# Patient Record
Sex: FEMALE | Race: WHITE | NOT HISPANIC OR LATINO | Employment: OTHER | URBAN - METROPOLITAN AREA
[De-identification: names, ages, dates, MRNs, and addresses within clinical notes are randomized per-mention and may not be internally consistent; named-entity substitution may affect disease eponyms.]

---

## 2023-05-30 ENCOUNTER — OFFICE VISIT (OUTPATIENT)
Dept: PRIMARY CARE | Facility: CLINIC | Age: 73
End: 2023-05-30
Payer: MEDICARE

## 2023-05-30 VITALS — SYSTOLIC BLOOD PRESSURE: 116 MMHG | WEIGHT: 153 LBS | DIASTOLIC BLOOD PRESSURE: 72 MMHG | BODY MASS INDEX: 22.59 KG/M2

## 2023-05-30 DIAGNOSIS — R73.01 IFG (IMPAIRED FASTING GLUCOSE): ICD-10-CM

## 2023-05-30 DIAGNOSIS — E78.5 HYPERLIPIDEMIA, UNSPECIFIED HYPERLIPIDEMIA TYPE: ICD-10-CM

## 2023-05-30 DIAGNOSIS — M81.0 OSTEOPOROSIS, UNSPECIFIED OSTEOPOROSIS TYPE, UNSPECIFIED PATHOLOGICAL FRACTURE PRESENCE: Primary | ICD-10-CM

## 2023-05-30 PROBLEM — M75.02 ADHESIVE CAPSULITIS OF LEFT SHOULDER: Status: ACTIVE | Noted: 2023-05-30

## 2023-05-30 PROBLEM — K63.5 COLON POLYP: Status: ACTIVE | Noted: 2023-05-30

## 2023-05-30 PROBLEM — L72.0 INCLUSION CYST: Status: ACTIVE | Noted: 2018-09-07

## 2023-05-30 PROBLEM — S82.009A CLOSED FRACTURE OF PATELLA: Status: ACTIVE | Noted: 2022-12-21

## 2023-05-30 PROBLEM — R92.2 DENSE BREAST: Status: ACTIVE | Noted: 2019-12-10

## 2023-05-30 PROBLEM — M85.80 OSTEOPENIA: Status: ACTIVE | Noted: 2023-05-30

## 2023-05-30 PROBLEM — E04.2 MULTIPLE THYROID NODULES: Status: ACTIVE | Noted: 2023-05-30

## 2023-05-30 PROBLEM — Z86.0100 HISTORY OF COLON POLYPS: Status: ACTIVE | Noted: 2021-10-27

## 2023-05-30 PROBLEM — E55.9 VITAMIN D INSUFFICIENCY: Status: ACTIVE | Noted: 2023-05-30

## 2023-05-30 PROBLEM — M67.90 TENDON NODULE: Status: ACTIVE | Noted: 2023-05-30

## 2023-05-30 PROBLEM — R79.89 LOW TSH LEVEL: Status: ACTIVE | Noted: 2023-05-30

## 2023-05-30 PROBLEM — M62.442: Status: ACTIVE | Noted: 2021-12-13

## 2023-05-30 PROBLEM — M79.676 TOE PAIN: Status: ACTIVE | Noted: 2023-05-30

## 2023-05-30 PROBLEM — F41.8 TEST ANXIETY: Status: ACTIVE | Noted: 2023-05-30

## 2023-05-30 PROBLEM — E11.9 TYPE 2 DIABETES MELLITUS TREATED WITHOUT INSULIN (MULTI): Status: ACTIVE | Noted: 2021-10-27

## 2023-05-30 PROBLEM — Z86.010 HISTORY OF COLON POLYPS: Status: ACTIVE | Noted: 2021-10-27

## 2023-05-30 PROBLEM — R92.8 ABNORMAL MAMMOGRAM: Status: ACTIVE | Noted: 2023-05-30

## 2023-05-30 PROBLEM — E04.9 NON-TOXIC GOITER: Status: ACTIVE | Noted: 2023-05-30

## 2023-05-30 PROBLEM — R92.30 DENSE BREAST: Status: ACTIVE | Noted: 2019-12-10

## 2023-05-30 LAB — POC HEMOGLOBIN A1C: 5.9 % (ref 4.2–6.5)

## 2023-05-30 PROCEDURE — 1160F RVW MEDS BY RX/DR IN RCRD: CPT | Performed by: FAMILY MEDICINE

## 2023-05-30 PROCEDURE — 99214 OFFICE O/P EST MOD 30 MIN: CPT | Performed by: FAMILY MEDICINE

## 2023-05-30 PROCEDURE — 1159F MED LIST DOCD IN RCRD: CPT | Performed by: FAMILY MEDICINE

## 2023-05-30 PROCEDURE — 3078F DIAST BP <80 MM HG: CPT | Performed by: FAMILY MEDICINE

## 2023-05-30 PROCEDURE — 3074F SYST BP LT 130 MM HG: CPT | Performed by: FAMILY MEDICINE

## 2023-05-30 PROCEDURE — 83036 HEMOGLOBIN GLYCOSYLATED A1C: CPT | Performed by: FAMILY MEDICINE

## 2023-05-30 PROCEDURE — 1036F TOBACCO NON-USER: CPT | Performed by: FAMILY MEDICINE

## 2023-05-30 RX ORDER — ASPIRIN 81 MG/1
81 TABLET ORAL DAILY
Qty: 90 TABLET | Refills: 1 | Status: SHIPPED | OUTPATIENT
Start: 2023-05-30 | End: 2023-11-29 | Stop reason: SDUPTHER

## 2023-05-30 RX ORDER — ATORVASTATIN CALCIUM 20 MG/1
20 TABLET, FILM COATED ORAL DAILY
Qty: 90 TABLET | Refills: 1 | Status: SHIPPED | OUTPATIENT
Start: 2023-05-30 | End: 2023-11-29 | Stop reason: SDUPTHER

## 2023-05-30 RX ORDER — ATORVASTATIN CALCIUM 20 MG/1
20 TABLET, FILM COATED ORAL DAILY
COMMUNITY
Start: 2019-07-01 | End: 2023-05-30 | Stop reason: SDUPTHER

## 2023-05-30 RX ORDER — ASPIRIN 81 MG/1
81 TABLET ORAL DAILY
COMMUNITY
Start: 2019-07-01 | End: 2023-05-30 | Stop reason: SDUPTHER

## 2023-05-30 RX ORDER — METFORMIN HYDROCHLORIDE 500 MG/1
500 TABLET, EXTENDED RELEASE ORAL
Qty: 90 TABLET | Refills: 1 | Status: SHIPPED | OUTPATIENT
Start: 2023-05-30 | End: 2023-11-29 | Stop reason: SDUPTHER

## 2023-05-30 RX ORDER — MULTIVITAMIN
1 TABLET ORAL
COMMUNITY

## 2023-05-30 RX ORDER — METFORMIN HYDROCHLORIDE 500 MG/1
500 TABLET, EXTENDED RELEASE ORAL
COMMUNITY
Start: 2019-04-03 | End: 2023-05-30 | Stop reason: SDUPTHER

## 2023-05-30 ASSESSMENT — ENCOUNTER SYMPTOMS
LOSS OF SENSATION IN FEET: 0
OCCASIONAL FEELINGS OF UNSTEADINESS: 0
DEPRESSION: 0

## 2023-05-30 NOTE — PATIENT INSTRUCTIONS
Remember to get Shingrix shot please.    Follow up fasting (no alcohol for 48 hours and just water for 14 hours) in three months for your next routine appointment.  In general, take any medications on schedule (except for types of Insulin).

## 2023-05-30 NOTE — PROGRESS NOTES
Subjective   Patient ID: [id number]     Amrita Pepe is a 73 y.o. female who presents for Med Management.    HPI  Taking meds as directed without tolerability or affordability issues; no complaints today.    Review of Systems    PSYCH-No complaints regarding libido, appetite, memory or concentration;  no drug use or alcohol usage over six per week;  tired regarding taking care of  with recent health issues  SLEEP-No complaints of insomnia, apnea or restless legs  ALL/IMMUN-No history of sneezing or itching  HEM-No unexplained bruising or bleeding    Objective     /72 (BP Location: Right arm, Patient Position: Sitting)   Wt 69.4 kg (153 lb)   BMI 22.59 kg/m²      Physical Exam  Neck-supple without lymphadenopathy or thyromegaly; no carotid bruits  Heart- regular rate and rhythm, normal s1 and s2 without murmur or gallop  Lungs-clear to auscultation  Abdomen-soft, positive bowel sounds, without masses, HSmegaly or pain   Assessment/Plan     Problem List Items Addressed This Visit          Musculoskeletal    Osteoporosis - Primary    Relevant Medications    calcium carbonate-vitamin D3 600 mg-10 mcg (400 unit) chewable tablet       Endocrine/Metabolic    IFG (impaired fasting glucose)    Relevant Medications    aspirin 81 mg EC tablet    metFORMIN XR (Glucophage-XR) 500 mg 24 hr tablet    Other Relevant Orders    POCT glycosylated hemoglobin (Hb A1C) manually resulted       Other    Hyperlipidemia    Relevant Medications    atorvastatin (Lipitor) 20 mg tablet     Remember to get Shingrix shot please.    Follow up fasting (no alcohol for 48 hours and just water for 14 hours) in three months for your next routine appointment.  In general, take any medications on schedule (except for types of Insulin).      Ye Bledsoe MD

## 2023-08-29 ENCOUNTER — OFFICE VISIT (OUTPATIENT)
Dept: PRIMARY CARE | Facility: CLINIC | Age: 73
End: 2023-08-29
Payer: MEDICARE

## 2023-08-29 VITALS
TEMPERATURE: 97.8 F | DIASTOLIC BLOOD PRESSURE: 68 MMHG | SYSTOLIC BLOOD PRESSURE: 108 MMHG | BODY MASS INDEX: 21.86 KG/M2 | WEIGHT: 148 LBS

## 2023-08-29 DIAGNOSIS — E78.5 HYPERLIPIDEMIA, UNSPECIFIED HYPERLIPIDEMIA TYPE: Primary | ICD-10-CM

## 2023-08-29 DIAGNOSIS — E11.9 TYPE 2 DIABETES MELLITUS TREATED WITHOUT INSULIN (MULTI): ICD-10-CM

## 2023-08-29 PROBLEM — M79.676 TOE PAIN: Status: RESOLVED | Noted: 2023-05-30 | Resolved: 2023-08-29

## 2023-08-29 PROBLEM — S82.009A CLOSED FRACTURE OF PATELLA: Status: RESOLVED | Noted: 2022-12-21 | Resolved: 2023-08-29

## 2023-08-29 LAB
POC FINGERSTICK BLOOD GLUCOSE: 118 MG/DL (ref 70–100)
POC HDL CHOLESTEROL: 81 MG/DL (ref 0–50)
POC LDL CHOLESTEROL: ABNORMAL
POC NON-HDL CHOLESTEROL: 62 MG/DL (ref 0–130)
POC TOTAL CHOLESTEROL/HDL RATIO: 1.8 (ref 0–4.5)
POC TOTAL CHOLESTEROL: 143 MG/DL (ref 0–199)
POC TRIGLYCERIDES: ABNORMAL

## 2023-08-29 PROCEDURE — 1036F TOBACCO NON-USER: CPT | Performed by: FAMILY MEDICINE

## 2023-08-29 PROCEDURE — 80061 LIPID PANEL: CPT | Performed by: FAMILY MEDICINE

## 2023-08-29 PROCEDURE — 99214 OFFICE O/P EST MOD 30 MIN: CPT | Performed by: FAMILY MEDICINE

## 2023-08-29 PROCEDURE — 1126F AMNT PAIN NOTED NONE PRSNT: CPT | Performed by: FAMILY MEDICINE

## 2023-08-29 PROCEDURE — 3078F DIAST BP <80 MM HG: CPT | Performed by: FAMILY MEDICINE

## 2023-08-29 PROCEDURE — 82962 GLUCOSE BLOOD TEST: CPT | Performed by: FAMILY MEDICINE

## 2023-08-29 PROCEDURE — 1159F MED LIST DOCD IN RCRD: CPT | Performed by: FAMILY MEDICINE

## 2023-08-29 PROCEDURE — 3074F SYST BP LT 130 MM HG: CPT | Performed by: FAMILY MEDICINE

## 2023-08-29 PROCEDURE — 1160F RVW MEDS BY RX/DR IN RCRD: CPT | Performed by: FAMILY MEDICINE

## 2023-08-29 NOTE — PATIENT INSTRUCTIONS
Please check with your insurance to verify whether you should get your shingles vaccination here or at the pharmacy, and whether it is covered.  The purpose of this shot is to prevent the permanent unremitting pain of Post Herpetic Neuralgia which becomes more common in elderly patients that get Shingles.  This shot can be very expensive.    Follow up in three months for your next routine appointment.

## 2023-08-29 NOTE — PROGRESS NOTES
Subjective   Patient ID: [id number]     Amrita Pepe is a 73 y.o. female who presents for Med Management.    HPI  Taking meds as directed without tolerability or affordability issues; no complaints today.    Review of Systems  General-denies weakness or myalgias; no unexplained fever or chills  OPTH-No dry eyes, itchy eyes, or blurry vision;  got a dilated eye exam in Spring 2023  ENT-No hearing loss, tinnitus or vertigo  NECK-no stiffness, swelling or pain    Objective     /68 (BP Location: Left arm, Patient Position: Sitting)   Temp 36.6 °C (97.8 °F) (Oral)   Wt 67.1 kg (148 lb)   BMI 21.86 kg/m²      Physical Exam  Eyes-pupils equal round, reactive to light and accommodation, fundi with normal cup/disc ratio, conjunctiva without redness or discharge  General- well defined, well nourished, well hydrated individual in NAD  Skin- normal color and turgor; without nail pitting  Head-normocephalic without masses or tenderness  Ears-normal pinnae, and canals, with normal landmarks and light reflex of tympanic membranes bilaterally  Nose-septum in the midline, normal mucosa bilaterally  Throat- without erythema or exudate, uvula in midlineNeck-supple without lymphadenopathy or thyromegaly; no carotid bruits  Heart- regular rate and rhythm, normal s1 and s2 without murmur or gallop  Lungs-clear to auscultation  Abdomen-soft, positive bowel sounds, without masses, HSmegaly or pain     Assessment/Plan     Problem List Items Addressed This Visit       Hyperlipidemia - Primary    Relevant Orders    POCT fingerstick glucose manually resulted (Completed)    POCT Lipid Panel manually resulted (Completed)    Type 2 diabetes mellitus treated without insulin (CMS/[facility])     Please check with your insurance to verify whether you should get your shingles vaccination here or at the pharmacy, and whether it is covered.  The purpose of this shot is to prevent the permanent unremitting pain of Post Herpetic Neuralgia which  becomes more common in elderly patients that get Shingles.  This shot can be very expensive.    Follow up in three months for your next routine appointment.    Ye Bledsoe MD

## 2023-11-03 ENCOUNTER — APPOINTMENT (OUTPATIENT)
Dept: PRIMARY CARE | Facility: CLINIC | Age: 73
End: 2023-11-03
Payer: COMMERCIAL

## 2023-11-07 ENCOUNTER — TELEPHONE (OUTPATIENT)
Dept: PRIMARY CARE | Facility: CLINIC | Age: 73
End: 2023-11-07
Payer: MEDICARE

## 2023-11-07 NOTE — TELEPHONE ENCOUNTER
Pt is calling in regards to the Shingles vaccine. Pt states that she got the Flu vaccine 3 weeks ago and the Covid vaccine a week ago. Pt wanted to know if she needed to wait before getting the shingles vaccine or if she could get the vaccine now? Pt is asking if someone can give her a call to let her know?

## 2023-11-28 PROBLEM — Z23 ENCOUNTER FOR IMMUNIZATION: Status: ACTIVE | Noted: 2023-11-28

## 2023-11-28 NOTE — PROGRESS NOTES
"Subjective   Reason for Visit: Amrita Pepe is an 73 y.o. female here for a Medicare Wellness visit.     Past Medical, Surgical, and Family History reviewed and updated in chart.  In the past 2 weeks this patient has not felt down, depressed, hopeless, lost interest or pleasure in doing things or thought of harming herself or others. The patient has not fallen in the last six months and has no loose rugs,  uneven floors or poor lighting at home.Advance Care Planning discussion was held.  The patient has no spiritual/Spiritism/cultural values or needs that we need to know. The patient does not feel threatened or abused physically, emotionally or sexually.  The patient feels safe in the home.  In the past month, there was not a day when the patient of anyone in the patient's family went hungry because there was not enough food.  The patient denies that they or the person with them has problems with hearing, speaking, reading, moving around or learning.  The patient states they are comfortable filling out medical forms.      Reviewed all medications by prescribing practitioner or clinical pharmacist (such as prescriptions, OTCs, herbal therapies and supplements) and documented in the medical record.    HPI  Taking meds as directed without tolerability or affordability issues; no complaints today.    Patient Care Team:  Ye Bledsoe MD as PCP - General  Ye Bledsoe MD as PCP - [facility] Attributed Provider     Review of Systems  Cardiovascular- No chest pain or pressure, nausea, diaphoresis, paresthesias, dizziness, or syncope with or without exertion  Gastrointestinal-No blood in stool, tarry stools, pain, vomiting, heartburn, constipation or diarrhea  Pulmonary-No wheezing, coughing or shortness of breath  Urology-No frequency, urgency, blood in urine, or incontinence    Objective   Vitals:  /85 (BP Location: Right arm, Patient Position: Sitting)   Temp 36.8 °C (98.2 °F) (Oral)   Ht 1.74 m (5' 8.5\")   " Wt 68 kg (150 lb)   BMI 22.48 kg/m²       Physical Exam  Neck-supple without lymphadenopathy or thyromegaly; no carotid bruits  Heart- regular rate and rhythm, normal s1 and s2 without murmur or gallop;  no edema  Lungs-clear to auscultation  Abdomen-soft, positive bowel sounds, without masses, HSmegaly or pain     Assessment/Plan   Problem List Items Addressed This Visit       Hyperlipidemia - Primary    Overview     Last Assessment & Plan: Formatting of this note might be different from the original. Assessment: on atorvastatin         Relevant Medications    atorvastatin (Lipitor) 20 mg tablet    Low TSH level    Relevant Orders    TSH    Non-toxic goiter    Relevant Orders    TSH    Multiple thyroid nodules    Relevant Orders    TSH    Osteoporosis    Overview     Hemanth Anne MD [facility]         Relevant Medications    calcium carbonate-vitamin D3 600 mg-10 mcg (400 unit) chewable tablet    Type 2 diabetes mellitus treated without insulin (CMS/[facility])    Overview     Last Assessment & Plan: Formatting of this note might be different from the original. Assessment: on metformin.  Managed by PCP         Relevant Medications    metFORMIN  mg 24 hr tablet    Other Relevant Orders    Hemoglobin A1c    Comprehensive metabolic panel    Vitamin B12    Vitamin D insufficiency    Relevant Orders    Vitamin D 25-Hydroxy,Total (for eval of Vitamin D levels)    Encounter for immunization     Please check with your insurance to verify whether you should get your shingles vaccination here or at the pharmacy, and whether it is covered.  The purpose of this shot is to prevent the permanent unremitting pain of Post Herpetic Neuralgia which becomes more common in elderly patients that get Shingles.  This shot can be very expensive.    Follow up fasting (no alcohol for 48 hours and just water for 14 hours) in three months for your next routine appointment.  In general, take any medications on schedule (except for types of Insulin).        Patient was identified as a fall risk. Risk prevention instructions provided.

## 2023-11-28 NOTE — PATIENT INSTRUCTIONS
Please check with your insurance to verify whether you should get your shingles vaccination here or at the pharmacy, and whether it is covered.  The purpose of this shot is to prevent the permanent unremitting pain of Post Herpetic Neuralgia which becomes more common in elderly patients that get Shingles.  This shot can be very expensive.    Follow up fasting (no alcohol for 48 hours and just water for 14 hours) in three months for your next routine appointment.  In general, take any medications on schedule (except for types of Insulin).              Ways to Help Prevent Falls at Home    Quick Tips   ? Ask for help if you need it. Most people want to help!   ? Get up slowly after sitting or laying down   ? Wear a medical alert device or keep cell phone in your pocket   ? Use night lights, especially areas near a bathroom   ? Keep the items you use often within reach on a small stool or end table   ? Use an assistive device such as walker or cane, as directed by provider/physical therapy   ? Use a non-slip mat and grab bars in your bathroom. Look for home health sections for best options     Other Areas to Focus On   ? Exercise and nutrition: Regular exercise or taking a falls prevention class are great ways improve strength and balance. Don’t forget to stay hydrated and bring a snack!   ? Medicine side effects: Some medicines can make you sleepy or dizzy, which could cause a fall. Ask your healthcare provider about the side effects your medicines could cause. Be sure to let them know if you take any vitamins or supplements as well.   ? Tripping hazards: Remove items you could trip on, such as loose mats, rugs, cords, and clutter. Wear closed toe shoes with rubber soles.   ? Health and wellness: Get regular checkups with your healthcare provider, plus routine vision and hearing screenings. Talk with your healthcare provider about:   o Your medicines and the possible side effects - bring them in a bag if that is  easier!   o Problems with balance or feeling dizzy   o Ways to promote bone health, such as Vitamin D and calcium supplements   o Questions or concerns about falling     *Ask your healthcare team if you have questions     ©[facility], 2022

## 2023-11-29 ENCOUNTER — OFFICE VISIT (OUTPATIENT)
Dept: PRIMARY CARE | Facility: CLINIC | Age: 73
End: 2023-11-29
Payer: MEDICARE

## 2023-11-29 ENCOUNTER — LAB (OUTPATIENT)
Dept: LAB | Facility: LAB | Age: 73
End: 2023-11-29
Payer: MEDICARE

## 2023-11-29 VITALS
BODY MASS INDEX: 22.22 KG/M2 | TEMPERATURE: 98.2 F | DIASTOLIC BLOOD PRESSURE: 85 MMHG | HEIGHT: 69 IN | SYSTOLIC BLOOD PRESSURE: 122 MMHG | WEIGHT: 150 LBS

## 2023-11-29 DIAGNOSIS — M81.0 OSTEOPOROSIS, UNSPECIFIED OSTEOPOROSIS TYPE, UNSPECIFIED PATHOLOGICAL FRACTURE PRESENCE: ICD-10-CM

## 2023-11-29 DIAGNOSIS — E11.9 TYPE 2 DIABETES MELLITUS TREATED WITHOUT INSULIN (MULTI): ICD-10-CM

## 2023-11-29 DIAGNOSIS — E78.5 HYPERLIPIDEMIA, UNSPECIFIED HYPERLIPIDEMIA TYPE: Primary | ICD-10-CM

## 2023-11-29 DIAGNOSIS — Z23 ENCOUNTER FOR IMMUNIZATION: ICD-10-CM

## 2023-11-29 DIAGNOSIS — E04.2 MULTIPLE THYROID NODULES: ICD-10-CM

## 2023-11-29 DIAGNOSIS — E04.9 NON-TOXIC GOITER: ICD-10-CM

## 2023-11-29 DIAGNOSIS — R79.89 LOW TSH LEVEL: ICD-10-CM

## 2023-11-29 DIAGNOSIS — E55.9 VITAMIN D INSUFFICIENCY: ICD-10-CM

## 2023-11-29 LAB
25(OH)D3 SERPL-MCNC: 38 NG/ML (ref 30–100)
ALBUMIN SERPL BCP-MCNC: 4.7 G/DL (ref 3.4–5)
ALP SERPL-CCNC: 94 U/L (ref 33–136)
ALT SERPL W P-5'-P-CCNC: 18 U/L (ref 7–45)
ANION GAP SERPL CALC-SCNC: 15 MMOL/L (ref 10–20)
AST SERPL W P-5'-P-CCNC: 17 U/L (ref 9–39)
BILIRUB SERPL-MCNC: 0.6 MG/DL (ref 0–1.2)
BUN SERPL-MCNC: 22 MG/DL (ref 6–23)
CALCIUM SERPL-MCNC: 10 MG/DL (ref 8.6–10.3)
CHLORIDE SERPL-SCNC: 101 MMOL/L (ref 98–107)
CO2 SERPL-SCNC: 28 MMOL/L (ref 21–32)
CREAT SERPL-MCNC: 0.77 MG/DL (ref 0.5–1.05)
EST. AVERAGE GLUCOSE BLD GHB EST-MCNC: 131 MG/DL
GFR SERPL CREATININE-BSD FRML MDRD: 82 ML/MIN/1.73M*2
GLUCOSE SERPL-MCNC: 115 MG/DL (ref 74–99)
HBA1C MFR BLD: 6.2 %
POTASSIUM SERPL-SCNC: 5.1 MMOL/L (ref 3.5–5.3)
PROT SERPL-MCNC: 7.4 G/DL (ref 6.4–8.2)
SODIUM SERPL-SCNC: 139 MMOL/L (ref 136–145)
TSH SERPL-ACNC: 0.21 MIU/L (ref 0.44–3.98)
VIT B12 SERPL-MCNC: 588 PG/ML (ref 211–911)

## 2023-11-29 PROCEDURE — 1036F TOBACCO NON-USER: CPT | Performed by: FAMILY MEDICINE

## 2023-11-29 PROCEDURE — G0444 DEPRESSION SCREEN ANNUAL: HCPCS | Performed by: FAMILY MEDICINE

## 2023-11-29 PROCEDURE — G0439 PPPS, SUBSEQ VISIT: HCPCS | Performed by: FAMILY MEDICINE

## 2023-11-29 PROCEDURE — 1126F AMNT PAIN NOTED NONE PRSNT: CPT | Performed by: FAMILY MEDICINE

## 2023-11-29 PROCEDURE — 80053 COMPREHEN METABOLIC PANEL: CPT

## 2023-11-29 PROCEDURE — 82607 VITAMIN B-12: CPT

## 2023-11-29 PROCEDURE — 1123F ACP DISCUSS/DSCN MKR DOCD: CPT | Performed by: FAMILY MEDICINE

## 2023-11-29 PROCEDURE — 36415 COLL VENOUS BLD VENIPUNCTURE: CPT

## 2023-11-29 PROCEDURE — 3074F SYST BP LT 130 MM HG: CPT | Performed by: FAMILY MEDICINE

## 2023-11-29 PROCEDURE — 3079F DIAST BP 80-89 MM HG: CPT | Performed by: FAMILY MEDICINE

## 2023-11-29 PROCEDURE — 82306 VITAMIN D 25 HYDROXY: CPT

## 2023-11-29 PROCEDURE — 1160F RVW MEDS BY RX/DR IN RCRD: CPT | Performed by: FAMILY MEDICINE

## 2023-11-29 PROCEDURE — 84443 ASSAY THYROID STIM HORMONE: CPT

## 2023-11-29 PROCEDURE — G0442 ANNUAL ALCOHOL SCREEN 15 MIN: HCPCS | Performed by: FAMILY MEDICINE

## 2023-11-29 PROCEDURE — 1170F FXNL STATUS ASSESSED: CPT | Performed by: FAMILY MEDICINE

## 2023-11-29 PROCEDURE — 83036 HEMOGLOBIN GLYCOSYLATED A1C: CPT

## 2023-11-29 PROCEDURE — 1159F MED LIST DOCD IN RCRD: CPT | Performed by: FAMILY MEDICINE

## 2023-11-29 PROCEDURE — 99214 OFFICE O/P EST MOD 30 MIN: CPT | Performed by: FAMILY MEDICINE

## 2023-11-29 RX ORDER — METFORMIN HYDROCHLORIDE 500 MG/1
500 TABLET, EXTENDED RELEASE ORAL
Qty: 90 TABLET | Refills: 1 | Status: SHIPPED | OUTPATIENT
Start: 2023-11-29 | End: 2023-11-29 | Stop reason: SDUPTHER

## 2023-11-29 RX ORDER — ATORVASTATIN CALCIUM 20 MG/1
20 TABLET, FILM COATED ORAL DAILY
Qty: 90 TABLET | Refills: 1 | Status: SHIPPED | OUTPATIENT
Start: 2023-11-29 | End: 2024-02-28 | Stop reason: SDUPTHER

## 2023-11-29 RX ORDER — METFORMIN HYDROCHLORIDE 500 MG/1
500 TABLET, EXTENDED RELEASE ORAL
Qty: 90 TABLET | Refills: 1 | Status: SHIPPED | OUTPATIENT
Start: 2023-11-29 | End: 2024-05-30 | Stop reason: SDUPTHER

## 2023-11-29 RX ORDER — ASPIRIN 81 MG/1
81 TABLET ORAL DAILY
Qty: 90 TABLET | Refills: 1 | Status: SHIPPED | OUTPATIENT
Start: 2023-11-29 | End: 2023-11-29

## 2023-11-29 ASSESSMENT — ENCOUNTER SYMPTOMS
DEPRESSION: 0
OCCASIONAL FEELINGS OF UNSTEADINESS: 0
LOSS OF SENSATION IN FEET: 0

## 2023-11-29 ASSESSMENT — ACTIVITIES OF DAILY LIVING (ADL)
DOING_HOUSEWORK: INDEPENDENT
GROCERY_SHOPPING: INDEPENDENT
BATHING: INDEPENDENT
MANAGING_FINANCES: INDEPENDENT
TAKING_MEDICATION: INDEPENDENT
DRESSING: INDEPENDENT

## 2023-11-29 ASSESSMENT — PATIENT HEALTH QUESTIONNAIRE - PHQ9
SUM OF ALL RESPONSES TO PHQ9 QUESTIONS 1 AND 2: 0
1. LITTLE INTEREST OR PLEASURE IN DOING THINGS: NOT AT ALL
2. FEELING DOWN, DEPRESSED OR HOPELESS: NOT AT ALL

## 2024-01-17 ENCOUNTER — OFFICE VISIT (OUTPATIENT)
Dept: PRIMARY CARE | Facility: CLINIC | Age: 74
End: 2024-01-17
Payer: MEDICARE

## 2024-01-17 VITALS
WEIGHT: 151 LBS | BODY MASS INDEX: 22.63 KG/M2 | OXYGEN SATURATION: 99 % | TEMPERATURE: 98.4 F | HEART RATE: 92 BPM | SYSTOLIC BLOOD PRESSURE: 139 MMHG | DIASTOLIC BLOOD PRESSURE: 88 MMHG

## 2024-01-17 DIAGNOSIS — E11.9 TYPE 2 DIABETES MELLITUS TREATED WITHOUT INSULIN (MULTI): ICD-10-CM

## 2024-01-17 DIAGNOSIS — J18.9 PNEUMONIA OF RIGHT UPPER LOBE DUE TO INFECTIOUS ORGANISM: Primary | ICD-10-CM

## 2024-01-17 PROCEDURE — 99495 TRANSJ CARE MGMT MOD F2F 14D: CPT | Performed by: FAMILY MEDICINE

## 2024-01-17 PROCEDURE — 1126F AMNT PAIN NOTED NONE PRSNT: CPT | Performed by: FAMILY MEDICINE

## 2024-01-17 PROCEDURE — 1036F TOBACCO NON-USER: CPT | Performed by: FAMILY MEDICINE

## 2024-01-17 PROCEDURE — 3075F SYST BP GE 130 - 139MM HG: CPT | Performed by: FAMILY MEDICINE

## 2024-01-17 PROCEDURE — 3079F DIAST BP 80-89 MM HG: CPT | Performed by: FAMILY MEDICINE

## 2024-01-17 PROCEDURE — 1159F MED LIST DOCD IN RCRD: CPT | Performed by: FAMILY MEDICINE

## 2024-01-17 PROCEDURE — 1160F RVW MEDS BY RX/DR IN RCRD: CPT | Performed by: FAMILY MEDICINE

## 2024-01-17 PROCEDURE — 1157F ADVNC CARE PLAN IN RCRD: CPT | Performed by: FAMILY MEDICINE

## 2024-01-17 RX ORDER — PROMETHAZINE HYDROCHLORIDE AND DEXTROMETHORPHAN HYDROBROMIDE 6.25; 15 MG/5ML; MG/5ML
5 SYRUP ORAL EVERY 6 HOURS PRN
COMMUNITY
Start: 2024-01-08 | End: 2024-01-17 | Stop reason: ALTCHOICE

## 2024-01-17 RX ORDER — LEVOFLOXACIN 500 MG/1
TABLET, FILM COATED ORAL
COMMUNITY
Start: 2024-01-08 | End: 2024-01-17 | Stop reason: ALTCHOICE

## 2024-01-17 NOTE — PROGRESS NOTES
Subjective   Patient ID: [id number]     Amrita Pepe is a 73 y.o. female who presents for ER Follow-up (pneumonia).    SHRUTHI Russell got a cough on 02JAN2024 and then worsened to the point that she visited [facility] in [place] and was diagnosed with pneumonia;  COVID and FLU negative;  she was treated with Levaquin and has improved.    Review of Systems  General-no unexplained fever since 08JAN2024  ENT- has mild sore throat  NECK-has sterno cledio mastoid soreness bilaterally  Cardiovascular- No chest pain  Gastrointestinal-No vomiting, heartburn, constipation or diarrhea  Pulmonary-No shortness of breath; she has minimal coughing    Objective     /88 (BP Location: Right arm, Patient Position: Sitting)   Pulse 92   Temp 36.9 °C (98.4 °F) (Oral)   Wt 68.5 kg (151 lb)   SpO2 99%   BMI 22.63 kg/m²      Physical Exam  General- well defined, well nourished, well hydrated individual in NAD  Skin- normal color and turgor  Ears-normal pinnae, and canals, with normal landmarks and light reflex of tympanic membranes bilaterally  Nose-septum in the midline, normal mucosa bilaterally  Throat- without erythema or exudate, uvula in midlineNeck-supple without lymphadenopathy or thyromegaly; no carotid bruits  Heart- regular rate and rhythm, normal s1 and s2 without murmur or gallop  Lungs-clear to auscultation    Assessment/Plan     Problem List Items Addressed This Visit       Type 2 diabetes mellitus treated without insulin (CMS/[facility])     Other Visit Diagnoses       Pneumonia of right upper lobe due to infectious organism    -  Primary    Relevant Orders    XR chest 2 views        Expect your aerobic capacity to take about a month to fully return.  Call for any questions.    Get the RSV immunization when you can.    Ye Bledsoe MD

## 2024-01-17 NOTE — PATIENT INSTRUCTIONS
Expect your aerobic capacity to take about a month to fully return.  Call for any questions.    Get the RSV immunization when you can.

## 2024-02-01 ENCOUNTER — HOSPITAL ENCOUNTER (OUTPATIENT)
Dept: RADIOLOGY | Facility: CLINIC | Age: 74
Discharge: HOME | End: 2024-02-01
Payer: MEDICARE

## 2024-02-01 DIAGNOSIS — J18.9 PNEUMONIA OF RIGHT UPPER LOBE DUE TO INFECTIOUS ORGANISM: ICD-10-CM

## 2024-02-01 PROCEDURE — 71046 X-RAY EXAM CHEST 2 VIEWS: CPT

## 2024-02-01 PROCEDURE — 71046 X-RAY EXAM CHEST 2 VIEWS: CPT | Performed by: RADIOLOGY

## 2024-02-26 NOTE — PATIENT INSTRUCTIONS
Please check with your insurance to verify whether you should get your shingles vaccination here or at the pharmacy, and whether it is covered.  The purpose of this shot is to prevent the permanent unremitting pain of Post Herpetic Neuralgia which becomes more common in elderly patients that get Shingles.  This shot can be very expensive.    Do the corner exercises as demonstrated for your left shoulder and call if your left and right shoulder range of motion is not equal within two weeks .    Follow up in three months for your next routine appointment.

## 2024-02-26 NOTE — PROGRESS NOTES
Subjective   Patient ID: [id number]     Amrita Pepe is a 73 y.o. female who presents for Med Management.    HPI  Taking meds as directed without tolerability or affordability issues; no complaints today.    Review of Systems  ENDO- No change in hair, voice, skin, weight or temperature tolerance   MSK-No locking, giving way/swelling of joints;  DC is stable but left little finger is numb after her surgery;  left shoulder is painful from time to time  NEURO- No daily headaches, hx of concussion, falls in the last year or seizure  DERM-No rashes, blanching or  change in any moles     Objective     /82 (BP Location: Left arm, Patient Position: Sitting)   Temp 36.7 °C (98.1 °F) (Oral)   Wt 68.9 kg (152 lb)   BMI 22.78 kg/m²      Physical Exam  Neck-supple without lymphadenopathy or thyromegaly; no carotid bruits  Throat- without erythema or exudate, uvula in midlineNeck-supple without lymphadenopathy or thyromegaly; no carotid bruits  Heart- regular rate and rhythm, normal s1 and s2 without murmur or gallop  Lungs-clear to auscultation  Abdomen-soft, positive bowel sounds, without masses, HSmegaly or pain   Left shoulder Exam- Full range of motion except abduction limited to 90 degrees;  Rotator cuff muscles examined individually and motor strength was 5/5 for the Suprispinatus, Infraspinatus, Teres Minor and Subscapularis Muscles;  Impingement testing was negative  Foot Exam-normal propioceptive, vibration and monofilament;  normal pulses, temperature and reflexes; normal hair distribution, skin integrity and color    Assessment/Plan     Problem List Items Addressed This Visit       Adhesive capsulitis of left shoulder    Dupuytren's contracture    Hyperlipidemia    Relevant Medications    atorvastatin (Lipitor) 20 mg tablet    Other Relevant Orders    POCT Lipid Panel manually resulted    Non-toxic goiter    Multiple thyroid nodules    Relevant Orders    US thyroid    Osteoporosis    Relevant Medications     calcium carbonate-vitamin D3 600 mg-10 mcg (400 unit) chewable tablet    RESOLVED: Metatarsalgia    Type 2 diabetes mellitus treated without insulin (CMS/[facility]) - Primary    Relevant Orders    POCT fingerstick glucose manually resulted    POCT Albumin random urine manually resulted     Please check with your insurance to verify whether you should get your shingles vaccination here or at the pharmacy, and whether it is covered.  The purpose of this shot is to prevent the permanent unremitting pain of Post Herpetic Neuralgia which becomes more common in elderly patients that get Shingles.  This shot can be very expensive.    Do the corner exercises as demonstrated for your left shoulder and call if your left and right shoulder range of motion is not equal within two weeks .    Follow up in three months for your next routine appointment.    Ye Bledsoe MD

## 2024-02-28 ENCOUNTER — OFFICE VISIT (OUTPATIENT)
Dept: PRIMARY CARE | Facility: CLINIC | Age: 74
End: 2024-02-28
Payer: MEDICARE

## 2024-02-28 VITALS
WEIGHT: 152 LBS | DIASTOLIC BLOOD PRESSURE: 82 MMHG | BODY MASS INDEX: 22.78 KG/M2 | TEMPERATURE: 98.1 F | SYSTOLIC BLOOD PRESSURE: 124 MMHG

## 2024-02-28 DIAGNOSIS — E78.5 HYPERLIPIDEMIA, UNSPECIFIED HYPERLIPIDEMIA TYPE: ICD-10-CM

## 2024-02-28 DIAGNOSIS — M72.0 DUPUYTREN'S CONTRACTURE: ICD-10-CM

## 2024-02-28 DIAGNOSIS — E04.2 MULTIPLE THYROID NODULES: ICD-10-CM

## 2024-02-28 DIAGNOSIS — M75.02 ADHESIVE CAPSULITIS OF LEFT SHOULDER: ICD-10-CM

## 2024-02-28 DIAGNOSIS — E11.9 TYPE 2 DIABETES MELLITUS TREATED WITHOUT INSULIN (MULTI): Primary | ICD-10-CM

## 2024-02-28 DIAGNOSIS — M77.40 METATARSALGIA, UNSPECIFIED LATERALITY: ICD-10-CM

## 2024-02-28 DIAGNOSIS — M81.0 OSTEOPOROSIS, UNSPECIFIED OSTEOPOROSIS TYPE, UNSPECIFIED PATHOLOGICAL FRACTURE PRESENCE: ICD-10-CM

## 2024-02-28 DIAGNOSIS — E04.9 NON-TOXIC GOITER: ICD-10-CM

## 2024-02-28 LAB
POC ALBUMIN /CREATININE RATIO MANUALLY ENTERED: <30 UG/MG CREAT
POC FINGERSTICK BLOOD GLUCOSE: 108 MG/DL (ref 70–100)
POC HDL CHOLESTEROL: 79 MG/DL (ref 0–50)
POC LDL CHOLESTEROL: 41 MG/DL (ref 0–100)
POC NON-HDL CHOLESTEROL: 52 MG/DL (ref 0–130)
POC TOTAL CHOLESTEROL/HDL RATIO: 1.7 (ref 0–4.5)
POC TOTAL CHOLESTEROL: 131 MG/DL (ref 0–199)
POC TRIGLYCERIDES: 54 MG/DL (ref 0–150)
POC URINE ALBUMIN: 30 MG/L
POC URINE CREATININE: 200 MG/DL

## 2024-02-28 PROCEDURE — 3074F SYST BP LT 130 MM HG: CPT | Performed by: FAMILY MEDICINE

## 2024-02-28 PROCEDURE — 82044 UR ALBUMIN SEMIQUANTITATIVE: CPT | Performed by: FAMILY MEDICINE

## 2024-02-28 PROCEDURE — 80061 LIPID PANEL: CPT | Performed by: FAMILY MEDICINE

## 2024-02-28 PROCEDURE — 1157F ADVNC CARE PLAN IN RCRD: CPT | Performed by: FAMILY MEDICINE

## 2024-02-28 PROCEDURE — 1036F TOBACCO NON-USER: CPT | Performed by: FAMILY MEDICINE

## 2024-02-28 PROCEDURE — 99214 OFFICE O/P EST MOD 30 MIN: CPT | Performed by: FAMILY MEDICINE

## 2024-02-28 PROCEDURE — 1126F AMNT PAIN NOTED NONE PRSNT: CPT | Performed by: FAMILY MEDICINE

## 2024-02-28 PROCEDURE — 2028F FOOT EXAM PERFORMED: CPT | Performed by: FAMILY MEDICINE

## 2024-02-28 PROCEDURE — 82962 GLUCOSE BLOOD TEST: CPT | Performed by: FAMILY MEDICINE

## 2024-02-28 PROCEDURE — 1160F RVW MEDS BY RX/DR IN RCRD: CPT | Performed by: FAMILY MEDICINE

## 2024-02-28 PROCEDURE — 3079F DIAST BP 80-89 MM HG: CPT | Performed by: FAMILY MEDICINE

## 2024-02-28 PROCEDURE — 1159F MED LIST DOCD IN RCRD: CPT | Performed by: FAMILY MEDICINE

## 2024-02-28 RX ORDER — ATORVASTATIN CALCIUM 20 MG/1
20 TABLET, FILM COATED ORAL DAILY
Qty: 90 TABLET | Refills: 1 | Status: SHIPPED | OUTPATIENT
Start: 2024-02-28 | End: 2024-05-30 | Stop reason: SDUPTHER

## 2024-03-07 ENCOUNTER — HOSPITAL ENCOUNTER (OUTPATIENT)
Dept: RADIOLOGY | Facility: CLINIC | Age: 74
Discharge: HOME | End: 2024-03-07
Payer: MEDICARE

## 2024-03-07 DIAGNOSIS — E04.2 MULTIPLE THYROID NODULES: ICD-10-CM

## 2024-03-07 PROCEDURE — 76536 US EXAM OF HEAD AND NECK: CPT

## 2024-03-07 PROCEDURE — 76536 US EXAM OF HEAD AND NECK: CPT | Performed by: RADIOLOGY

## 2024-04-02 DIAGNOSIS — E78.5 HYPERLIPIDEMIA, UNSPECIFIED HYPERLIPIDEMIA TYPE: ICD-10-CM

## 2024-04-02 RX ORDER — ATORVASTATIN CALCIUM 20 MG/1
20 TABLET, FILM COATED ORAL DAILY
Qty: 90 TABLET | Refills: 1 | OUTPATIENT
Start: 2024-04-02

## 2024-05-26 DIAGNOSIS — E11.9 TYPE 2 DIABETES MELLITUS TREATED WITHOUT INSULIN (MULTI): ICD-10-CM

## 2024-05-28 RX ORDER — METFORMIN HYDROCHLORIDE 500 MG/1
500 TABLET, EXTENDED RELEASE ORAL
Qty: 90 TABLET | Refills: 1 | OUTPATIENT
Start: 2024-05-28

## 2024-05-30 ENCOUNTER — OFFICE VISIT (OUTPATIENT)
Dept: PRIMARY CARE | Facility: CLINIC | Age: 74
End: 2024-05-30
Payer: MEDICARE

## 2024-05-30 ENCOUNTER — APPOINTMENT (OUTPATIENT)
Dept: PRIMARY CARE | Facility: CLINIC | Age: 74
End: 2024-05-30
Payer: MEDICARE

## 2024-05-30 VITALS
SYSTOLIC BLOOD PRESSURE: 114 MMHG | DIASTOLIC BLOOD PRESSURE: 60 MMHG | HEIGHT: 69 IN | BODY MASS INDEX: 22.81 KG/M2 | HEART RATE: 93 BPM | WEIGHT: 154 LBS | OXYGEN SATURATION: 98 %

## 2024-05-30 DIAGNOSIS — E11.9 TYPE 2 DIABETES MELLITUS WITHOUT COMPLICATION, WITHOUT LONG-TERM CURRENT USE OF INSULIN (MULTI): ICD-10-CM

## 2024-05-30 DIAGNOSIS — M81.8 OTHER OSTEOPOROSIS, UNSPECIFIED PATHOLOGICAL FRACTURE PRESENCE: ICD-10-CM

## 2024-05-30 DIAGNOSIS — E78.5 HYPERLIPIDEMIA, UNSPECIFIED HYPERLIPIDEMIA TYPE: ICD-10-CM

## 2024-05-30 DIAGNOSIS — E04.2 MULTIPLE THYROID NODULES: ICD-10-CM

## 2024-05-30 DIAGNOSIS — E11.9 TYPE 2 DIABETES MELLITUS TREATED WITHOUT INSULIN (MULTI): Primary | ICD-10-CM

## 2024-05-30 DIAGNOSIS — M72.0 DUPUYTREN'S CONTRACTURE: ICD-10-CM

## 2024-05-30 LAB — POC HEMOGLOBIN A1C: 6.1 % (ref 4.2–6.5)

## 2024-05-30 PROCEDURE — 1157F ADVNC CARE PLAN IN RCRD: CPT | Performed by: NURSE PRACTITIONER

## 2024-05-30 PROCEDURE — 1159F MED LIST DOCD IN RCRD: CPT | Performed by: NURSE PRACTITIONER

## 2024-05-30 PROCEDURE — 1160F RVW MEDS BY RX/DR IN RCRD: CPT | Performed by: NURSE PRACTITIONER

## 2024-05-30 PROCEDURE — 83036 HEMOGLOBIN GLYCOSYLATED A1C: CPT | Performed by: NURSE PRACTITIONER

## 2024-05-30 PROCEDURE — 2028F FOOT EXAM PERFORMED: CPT | Performed by: NURSE PRACTITIONER

## 2024-05-30 PROCEDURE — 99214 OFFICE O/P EST MOD 30 MIN: CPT | Performed by: NURSE PRACTITIONER

## 2024-05-30 PROCEDURE — 3074F SYST BP LT 130 MM HG: CPT | Performed by: NURSE PRACTITIONER

## 2024-05-30 PROCEDURE — 3078F DIAST BP <80 MM HG: CPT | Performed by: NURSE PRACTITIONER

## 2024-05-30 PROCEDURE — 1036F TOBACCO NON-USER: CPT | Performed by: NURSE PRACTITIONER

## 2024-05-30 RX ORDER — METFORMIN HYDROCHLORIDE 500 MG/1
500 TABLET, EXTENDED RELEASE ORAL
Qty: 90 TABLET | Refills: 0 | Status: SHIPPED | OUTPATIENT
Start: 2024-05-30

## 2024-05-30 RX ORDER — ATORVASTATIN CALCIUM 20 MG/1
20 TABLET, FILM COATED ORAL DAILY
Qty: 90 TABLET | Refills: 0 | Status: SHIPPED | OUTPATIENT
Start: 2024-05-30

## 2024-05-30 ASSESSMENT — PATIENT HEALTH QUESTIONNAIRE - PHQ9
2. FEELING DOWN, DEPRESSED OR HOPELESS: NOT AT ALL
SUM OF ALL RESPONSES TO PHQ9 QUESTIONS 1 & 2: 0
1. LITTLE INTEREST OR PLEASURE IN DOING THINGS: NOT AT ALL

## 2024-05-30 NOTE — PROGRESS NOTES
Subjective   Patient ID: Amrita Pepe is a 74 y.o. female who presents for Med Refill (Dr Ye Bledsoe patient/).    HPI     Diet: mix of healthy and unhealthy  Exercise: walking, weights   Water: 16 ounces per day   Nicotine: denies   Alcohol:     Hyperlipidemia:   Med: Lipitor  Denies myalgias or arthralgias.    DM2:   Med:   Metformin   Does not checks blood sugars.     Osteoporosis:   Dexa    Previously on Alendronate  Med: Calcium and Vit d    Multinodular goiter:   U/s thyroid 3/7/24 -stable     Dupuytren's Contracture:   Will pursue getting right hand fixed in the fall     Past Medical History:   Diagnosis Date    Disorder of thyroid, unspecified     Thyroid trouble    Other conditions influencing health status 2021    History of cough    Personal history of other diseases of the musculoskeletal system and connective tissue     History of arthritis    Personal history of other diseases of the respiratory system 2021    History of sore throat    Personal history of other infectious and parasitic diseases 2021    History of viral infection    Personal history of other mental and behavioral disorders 2018    History of adjustment disorder     Past Surgical History:   Procedure Laterality Date    FOOT SURGERY  2015    Foot Surgery     Social History     Socioeconomic History    Marital status:      Spouse name: Not on file    Number of children: Not on file    Years of education: Not on file    Highest education level: Not on file   Occupational History    Not on file   Tobacco Use    Smoking status: Former     Current packs/day: 0.00     Average packs/day: 1 pack/day for 10.0 years (10.0 ttl pk-yrs)     Types: Cigarettes     Start date:      Quit date:      Years since quittin.4    Smokeless tobacco: Never   Substance and Sexual Activity    Alcohol use: Yes     Alcohol/week: 5.0 standard drinks of alcohol     Types: 5 Glasses of wine per week    Drug use:  "Not on file    Sexual activity: Not on file   Other Topics Concern    Not on file   Social History Narrative    Exercise: none [id number]     Social Determinants of Health     Financial Resource Strain: Not on file   Food Insecurity: Not on file   Transportation Needs: Not on file   Physical Activity: Not on file   Stress: Not on file   Social Connections: Not on file   Intimate Partner Violence: Not on file   Housing Stability: Not on file     Family History   Problem Relation Name Age of Onset    Pancreatic cancer Mother      Heart failure Father      COPD Father      COPD Sister Aziza     Diabetes Sister Aziza          at 73    Atrial fibrillation Sister Aziza          Review of Systems    Objective   Visit Vitals  /60   Pulse 93   Ht 1.74 m (5' 8.5\")   Wt 69.9 kg (154 lb)   SpO2 98%   BMI 23.08 kg/m²   Smoking Status Former   BSA 1.84 m²         Physical Exam    Alert and oriented x 3  Neck supple without carotid bruit or lymph adenopathy   Heart regular rate and rhythm without murmur  Lungs clear to auscultation.  Legs without edema.  Gait is non-antalgic  Speech clear.  Hearing adequate.  Psych: Normal affect. Good judgment and insight.       Assessment/Plan     1. Type 2 diabetes mellitus without complication, without long-term current use of insulin (Multi)  - POCT glycosylated hemoglobin (Hb A1C) manually resulted  - metFORMIN  mg 24 hr tablet; Take 1 tablet (500 mg) by mouth once daily in the evening. Take with meals.  Dispense: 90 tablet; Refill: 0    2. Hyperlipidemia, unspecified hyperlipidemia type  - atorvastatin (Lipitor) 20 mg tablet; Take 1 tablet (20 mg) by mouth once daily.  Dispense: 90 tablet; Refill: 0    3. Multiple thyroid nodules      4. Other osteoporosis, unspecified pathological fracture presence   Continue current medications.    5. Dupuytren's contracture  Follow-up with specialist per their discretion/direction.     Follow up: 3 months with Dr Ye Bledsoe    Medications " refills will be completed as discussed.     Any labs or testing that is ordered will be reviewed and the results will be in your chart .   You can review these via  MONTAJ.     Prescriptions will not be filled unless you are compliant with your follow-up appointments or have a follow-up appointment scheduled as per the instruction of your provider. Refills for medications should be requested at the time of your office visit.     Please allow one week for refill requests to be completed.     Contact office with any questions or concerns.   Preferred communication is via  MONTAJ      Call [employer]: [phone] to assist with scheduling.      Delia [facility]  [address], [address]  [place] [address]  Phone: [phone]    **Charting was completed using voice recognition technology and may include unintended errors**

## 2024-05-31 ENCOUNTER — APPOINTMENT (OUTPATIENT)
Dept: PRIMARY CARE | Facility: CLINIC | Age: 74
End: 2024-05-31
Payer: MEDICARE

## 2024-05-31 DIAGNOSIS — E11.9 TYPE 2 DIABETES MELLITUS TREATED WITHOUT INSULIN (MULTI): ICD-10-CM

## 2024-05-31 RX ORDER — METFORMIN HYDROCHLORIDE 500 MG/1
500 TABLET, EXTENDED RELEASE ORAL
Qty: 90 TABLET | Refills: 0 | OUTPATIENT
Start: 2024-05-31

## 2024-08-27 NOTE — PATIENT INSTRUCTIONS
Considering your health conditions, I recommend that you get the RSV vaccine at your local pharmacy.    See your eye doctor for a dilated eye exam at least every year to screen for the gradual loss of vision that can occur with Glaucoma and the potentially catastrophic effects of Diabetes.    Follow up in three months for your next routine appointment.

## 2024-08-30 ENCOUNTER — APPOINTMENT (OUTPATIENT)
Dept: PRIMARY CARE | Facility: CLINIC | Age: 74
End: 2024-08-30
Payer: MEDICARE

## 2024-08-30 VITALS
TEMPERATURE: 97.5 F | BODY MASS INDEX: 22.89 KG/M2 | DIASTOLIC BLOOD PRESSURE: 74 MMHG | SYSTOLIC BLOOD PRESSURE: 124 MMHG | WEIGHT: 152.78 LBS

## 2024-08-30 DIAGNOSIS — E11.9 TYPE 2 DIABETES MELLITUS TREATED WITHOUT INSULIN (MULTI): Primary | ICD-10-CM

## 2024-08-30 DIAGNOSIS — E04.2 MULTIPLE THYROID NODULES: ICD-10-CM

## 2024-08-30 DIAGNOSIS — E04.9 NON-TOXIC GOITER: ICD-10-CM

## 2024-08-30 DIAGNOSIS — E78.5 HYPERLIPIDEMIA, UNSPECIFIED HYPERLIPIDEMIA TYPE: ICD-10-CM

## 2024-08-30 LAB
POC FINGERSTICK BLOOD GLUCOSE: 97 MG/DL (ref 70–100)
POC HDL CHOLESTEROL: 79 MG/DL (ref 0–50)
POC LDL CHOLESTEROL: 48 MG/DL (ref 0–100)
POC NON-HDL CHOLESTEROL: 64 MG/DL (ref 0–130)
POC TOTAL CHOLESTEROL/HDL RATIO: 1.8 (ref 0–4.5)
POC TOTAL CHOLESTEROL: 143 MG/DL (ref 0–199)
POC TRIGLYCERIDES: 77 MG/DL (ref 0–150)

## 2024-08-30 PROCEDURE — 3074F SYST BP LT 130 MM HG: CPT | Performed by: FAMILY MEDICINE

## 2024-08-30 PROCEDURE — 82962 GLUCOSE BLOOD TEST: CPT | Performed by: FAMILY MEDICINE

## 2024-08-30 PROCEDURE — 80061 LIPID PANEL: CPT | Performed by: FAMILY MEDICINE

## 2024-08-30 PROCEDURE — 1157F ADVNC CARE PLAN IN RCRD: CPT | Performed by: FAMILY MEDICINE

## 2024-08-30 PROCEDURE — 1123F ACP DISCUSS/DSCN MKR DOCD: CPT | Performed by: FAMILY MEDICINE

## 2024-08-30 PROCEDURE — 2028F FOOT EXAM PERFORMED: CPT | Performed by: FAMILY MEDICINE

## 2024-08-30 PROCEDURE — 1160F RVW MEDS BY RX/DR IN RCRD: CPT | Performed by: FAMILY MEDICINE

## 2024-08-30 PROCEDURE — 99214 OFFICE O/P EST MOD 30 MIN: CPT | Performed by: FAMILY MEDICINE

## 2024-08-30 PROCEDURE — 3078F DIAST BP <80 MM HG: CPT | Performed by: FAMILY MEDICINE

## 2024-08-30 PROCEDURE — 1159F MED LIST DOCD IN RCRD: CPT | Performed by: FAMILY MEDICINE

## 2024-08-30 RX ORDER — ATORVASTATIN CALCIUM 20 MG/1
20 TABLET, FILM COATED ORAL DAILY
Qty: 90 TABLET | Refills: 0 | Status: SHIPPED | OUTPATIENT
Start: 2024-08-30

## 2024-08-30 RX ORDER — METFORMIN HYDROCHLORIDE 500 MG/1
500 TABLET, EXTENDED RELEASE ORAL
Qty: 90 TABLET | Refills: 0 | Status: SHIPPED | OUTPATIENT
Start: 2024-08-30

## 2024-09-13 ENCOUNTER — OFFICE VISIT (OUTPATIENT)
Dept: PRIMARY CARE | Facility: CLINIC | Age: 74
End: 2024-09-13
Payer: MEDICARE

## 2024-09-13 VITALS
SYSTOLIC BLOOD PRESSURE: 141 MMHG | WEIGHT: 154.32 LBS | TEMPERATURE: 97.8 F | BODY MASS INDEX: 23.12 KG/M2 | DIASTOLIC BLOOD PRESSURE: 78 MMHG

## 2024-09-13 DIAGNOSIS — R42 VERTIGO: Primary | ICD-10-CM

## 2024-09-13 PROCEDURE — 1159F MED LIST DOCD IN RCRD: CPT | Performed by: FAMILY MEDICINE

## 2024-09-13 PROCEDURE — 1123F ACP DISCUSS/DSCN MKR DOCD: CPT | Performed by: FAMILY MEDICINE

## 2024-09-13 PROCEDURE — 1157F ADVNC CARE PLAN IN RCRD: CPT | Performed by: FAMILY MEDICINE

## 2024-09-13 PROCEDURE — 3078F DIAST BP <80 MM HG: CPT | Performed by: FAMILY MEDICINE

## 2024-09-13 PROCEDURE — 99213 OFFICE O/P EST LOW 20 MIN: CPT | Performed by: FAMILY MEDICINE

## 2024-09-13 PROCEDURE — 1036F TOBACCO NON-USER: CPT | Performed by: FAMILY MEDICINE

## 2024-09-13 PROCEDURE — 2028F FOOT EXAM PERFORMED: CPT | Performed by: FAMILY MEDICINE

## 2024-09-13 PROCEDURE — 1160F RVW MEDS BY RX/DR IN RCRD: CPT | Performed by: FAMILY MEDICINE

## 2024-09-13 PROCEDURE — 3077F SYST BP >= 140 MM HG: CPT | Performed by: FAMILY MEDICINE

## 2024-09-13 NOTE — PATIENT INSTRUCTIONS
Your Benign Positional Vertigo can be alleviated by reproducing the movement that triggered the vertigo and keeping your eyes fixed on a point on the horizon or inside, choose a wall switch.  Then you need to repeat the maneuver five times, each time waiting until the issue resolves.  Call if your symptoms persist over one week.

## 2024-09-13 NOTE — PROGRESS NOTES
Subjective   Patient ID: [id number]     Amrita Pepe is a 74 y.o. female who presents for Dizziness.    HPI  Got up this morning and felt unsteady on her feet leaning to the left a for a second or so;  developed some nausea later in the morning    Review of Systems  ID-no fever  ENT- no hearing loss, tinnitus or vertigo; had a sore throat last week  ENDO- No change in hair, voice, skin, weight or temperature tolerance   Cardiovascular- No chest pain or pressure, nausea, diaphoresis, paresthesias, dizziness, or syncope with or without exertion  Gastrointestinal-No blood in stool, tarry stools, pain, vomiting, heartburn, constipation or diarrhea  Pulmonary-No wheezing, coughing or shortness of breath  Urology-No frequency, urgency, blood in urine, or incontinence  NEURO- No daily headaches, hx of concussion, fall or seizure in the last year     Objective     /78 (BP Location: Left arm, Patient Position: Sitting)   Temp 36.6 °C (97.8 °F) (Oral)   Wt 70 kg (154 lb 5.2 oz)   BMI 23.12 kg/m²      Physical Exam  Neck-supple without lymphadenopathy or thyromegaly; no carotid bruits  Heart- regular rate and rhythm, normal s1 and s2 without murmur or gallop  Lungs-clear to auscultation  General- well defined, well nourished, well hydrated individual in NAD  Ears-normal pinnae, and canals, with normal landmarks and light reflex of tympanic membranes bilaterally  Abdomen-soft, positive bowel sounds, without masses, HSmegaly or pain   Neuro- alert and & oriented times three;  CN's II through XII grossly intact; DTR's 2+ and symmetrical;  Negative Romberg;  muscle strength is 5/5 bilateral upper and lower extremities  Ocular nystagmus easily extinguished with sit to supine    Assessment/Plan     Problem List Items Addressed This Visit       Vertigo - Primary     Your Benign Positional Vertigo can be alleviated by reproducing the movement that triggered the vertigo and keeping your eyes fixed on a point on the horizon  or inside, choose a wall switch.  Then you need to repeat the maneuver five times, each time waiting until the issue resolves.  Call if your symptoms persist over one week.        Ye Bledsoe MD

## 2024-11-14 ENCOUNTER — APPOINTMENT (OUTPATIENT)
Dept: PRIMARY CARE | Facility: CLINIC | Age: 74
End: 2024-11-14
Payer: MEDICARE

## 2024-11-29 DIAGNOSIS — E11.9 TYPE 2 DIABETES MELLITUS TREATED WITHOUT INSULIN (MULTI): ICD-10-CM

## 2024-12-01 RX ORDER — METFORMIN HYDROCHLORIDE 500 MG/1
TABLET, EXTENDED RELEASE ORAL
Qty: 90 TABLET | Refills: 0 | OUTPATIENT
Start: 2024-12-01

## 2024-12-10 NOTE — PROGRESS NOTES
"Subjective   Reason for Visit: Amrita Pepe is an 74 y.o. female here for a Medicare Wellness visit.   Past Medical, Surgical, and Family History reviewed and updated in chart.    In the past 2 weeks this patient has not felt down, depressed, hopeless, lost interest or pleasure in doing things or thought of harming herself or others and this was discussed over five minutes. The patient has not fallen in the last six months and has no loose rugs,  uneven floors or poor lighting at home.  Advance Care Planning discussion was held over 5 minutes.  The patient has no spiritual/Confucianism/cultural values or needs that we need to know. The patient does not feel threatened or abused physically, emotionally or sexually.  The patient feels safe in the home.  In the past month, there was not a day when the patient of anyone in the patient's family went hungry because there was not enough food.  The patient denies that they or the person with them has problems with hearing, speaking, reading, moving around or learning.  The patient states they are comfortable filling out medical forms. Alcohol usage was dicussed over five minutes.    Reviewed all medications by prescribing practitioner or clinical pharmacist (such as prescriptions, OTCs, herbal therapies and supplements) and documented in the medical record.  HPI  Taking meds as directed without tolerability or affordability issues.  Still with left shoulder pain and tightness    Patient Care Team:  Ye Bledsoe MD as PCP - General  Ye Bledsoe MD as PCP [facility]     Review of Systems  ENDO-no voice, skin, hair or change in temperature tolerance;  no swelling in neck  HEM-no unexplained bruising or bleeding  PSYCH-mood is good; waking up rested without gasping or restless legs    Objective   Vitals:  /82 (BP Location: Right arm, Patient Position: Sitting)   Temp 36.6 °C (97.8 °F) (Oral)   Ht 1.753 m (5' 9\")   Wt 70 kg (154 lb 5.2 oz)   " BMI 22.79 kg/m²       Physical Exam  Neck-supple without lymphadenopathy or thyromegaly; no carotid bruits  Heart- regular rate and rhythm, normal s1 and s2 without murmur or gallop;  no edema  Lungs-clear to auscultation  Abdomen-soft, positive bowel sounds, without masses, HSmegaly or pain   MS-bilateral dupuytren;  left shoulder abduction limited to 120 degrees ;  rotator cuff muscles 5/5 bilaterally and no impingement signs  Assessment/Plan   Problem List Items Addressed This Visit       Adhesive capsulitis of left shoulder    Relevant Orders    Referral to Physical Therapy    Dupuytren's contracture    Overview     Last Assessment & Plan: Formatting of this note might be different from the original. Assessment: scheduled for surgery         Relevant Orders    Referral to Orthopaedic Surgery    Hyperlipidemia    Overview     Last Assessment & Plan: Formatting of this note might be different from the original. Assessment: on atorvastatin         Relevant Medications    atorvastatin (Lipitor) 20 mg tablet    Multiple thyroid nodules    Type 2 diabetes mellitus treated without insulin (Multi) - Primary    Overview     Last Assessment & Plan: Formatting of this note might be different from the original. Assessment: on metformin.  Managed by PCP         Relevant Medications    metFORMIN  mg 24 hr tablet    Other Relevant Orders    POCT glycosylated hemoglobin (Hb A1C) manually resulted       Do the corner exercises as discussed for the next two weeks and physical therapy.  Please call with an update in one month.    Follow up fasting (no alcohol for 48 hours and just water for 14 hours) in three months for your next routine appointment.  In general, take any medications on schedule (except for types of Insulin).

## 2024-12-11 ENCOUNTER — APPOINTMENT (OUTPATIENT)
Dept: PRIMARY CARE | Facility: CLINIC | Age: 74
End: 2024-12-11
Payer: MEDICARE

## 2024-12-11 VITALS
BODY MASS INDEX: 22.86 KG/M2 | WEIGHT: 154.32 LBS | DIASTOLIC BLOOD PRESSURE: 82 MMHG | HEIGHT: 69 IN | SYSTOLIC BLOOD PRESSURE: 119 MMHG | TEMPERATURE: 97.8 F

## 2024-12-11 DIAGNOSIS — E78.5 HYPERLIPIDEMIA, UNSPECIFIED HYPERLIPIDEMIA TYPE: ICD-10-CM

## 2024-12-11 DIAGNOSIS — E04.2 MULTIPLE THYROID NODULES: ICD-10-CM

## 2024-12-11 DIAGNOSIS — M72.0 DUPUYTREN'S CONTRACTURE: ICD-10-CM

## 2024-12-11 DIAGNOSIS — M75.02 ADHESIVE CAPSULITIS OF LEFT SHOULDER: ICD-10-CM

## 2024-12-11 DIAGNOSIS — E11.9 TYPE 2 DIABETES MELLITUS TREATED WITHOUT INSULIN (MULTI): Primary | ICD-10-CM

## 2024-12-11 LAB — POC HEMOGLOBIN A1C: 6.2 % (ref 4.2–6.5)

## 2024-12-11 PROCEDURE — 1170F FXNL STATUS ASSESSED: CPT | Performed by: FAMILY MEDICINE

## 2024-12-11 PROCEDURE — 1157F ADVNC CARE PLAN IN RCRD: CPT | Performed by: FAMILY MEDICINE

## 2024-12-11 PROCEDURE — 3079F DIAST BP 80-89 MM HG: CPT | Performed by: FAMILY MEDICINE

## 2024-12-11 PROCEDURE — G0442 ANNUAL ALCOHOL SCREEN 15 MIN: HCPCS | Performed by: FAMILY MEDICINE

## 2024-12-11 PROCEDURE — 1159F MED LIST DOCD IN RCRD: CPT | Performed by: FAMILY MEDICINE

## 2024-12-11 PROCEDURE — 99214 OFFICE O/P EST MOD 30 MIN: CPT | Performed by: FAMILY MEDICINE

## 2024-12-11 PROCEDURE — 3008F BODY MASS INDEX DOCD: CPT | Performed by: FAMILY MEDICINE

## 2024-12-11 PROCEDURE — 1123F ACP DISCUSS/DSCN MKR DOCD: CPT | Performed by: FAMILY MEDICINE

## 2024-12-11 PROCEDURE — 2028F FOOT EXAM PERFORMED: CPT | Performed by: FAMILY MEDICINE

## 2024-12-11 PROCEDURE — 1160F RVW MEDS BY RX/DR IN RCRD: CPT | Performed by: FAMILY MEDICINE

## 2024-12-11 PROCEDURE — 1036F TOBACCO NON-USER: CPT | Performed by: FAMILY MEDICINE

## 2024-12-11 PROCEDURE — G0439 PPPS, SUBSEQ VISIT: HCPCS | Performed by: FAMILY MEDICINE

## 2024-12-11 PROCEDURE — G0444 DEPRESSION SCREEN ANNUAL: HCPCS | Performed by: FAMILY MEDICINE

## 2024-12-11 PROCEDURE — 83036 HEMOGLOBIN GLYCOSYLATED A1C: CPT | Performed by: FAMILY MEDICINE

## 2024-12-11 PROCEDURE — 3074F SYST BP LT 130 MM HG: CPT | Performed by: FAMILY MEDICINE

## 2024-12-11 RX ORDER — ATORVASTATIN CALCIUM 20 MG/1
20 TABLET, FILM COATED ORAL DAILY
Qty: 90 TABLET | Refills: 0 | Status: SHIPPED | OUTPATIENT
Start: 2024-12-11

## 2024-12-11 RX ORDER — METFORMIN HYDROCHLORIDE 500 MG/1
500 TABLET, EXTENDED RELEASE ORAL
Qty: 90 TABLET | Refills: 0 | Status: SHIPPED | OUTPATIENT
Start: 2024-12-11

## 2024-12-11 ASSESSMENT — ACTIVITIES OF DAILY LIVING (ADL)
BATHING: INDEPENDENT
DRESSING: INDEPENDENT
GROCERY_SHOPPING: INDEPENDENT
MANAGING_FINANCES: INDEPENDENT
TAKING_MEDICATION: INDEPENDENT
DOING_HOUSEWORK: INDEPENDENT

## 2024-12-11 ASSESSMENT — ENCOUNTER SYMPTOMS
DEPRESSION: 0
LOSS OF SENSATION IN FEET: 0
OCCASIONAL FEELINGS OF UNSTEADINESS: 0

## 2024-12-11 ASSESSMENT — PATIENT HEALTH QUESTIONNAIRE - PHQ9
2. FEELING DOWN, DEPRESSED OR HOPELESS: NOT AT ALL
SUM OF ALL RESPONSES TO PHQ9 QUESTIONS 1 AND 2: 0
1. LITTLE INTEREST OR PLEASURE IN DOING THINGS: NOT AT ALL

## 2025-03-06 NOTE — PATIENT INSTRUCTIONS
See your eye doctor for a dilated eye exam at least every year to screen for the gradual loss of vision that can occur with Glaucoma and the potentially catastrophic effects of Diabetes.    Follow up in three months for your next routine appointment.    Ye Bledsoe MD

## 2025-03-06 NOTE — PROGRESS NOTES
Subjective   Patient ID: [id number]     Amrita Pepe is a 74 y.o. female who presents for Med Management.    HPI  Taking meds as directed without tolerability or affordability issues; no complaints today.    Review of Systems  GEN-denies weakness or myalgias; no unexplained fever or chills  OPTH-No dry eyes, itchy eyes, or blurry vision   ENT-No hearing loss, tinnitus or vertigo  NECK-no stiffness, swelling or pain    Objective     /86 (BP Location: Right arm, Patient Position: Sitting)   Temp 36.4 °C (97.6 °F) (Oral)   Wt 70 kg (154 lb 5.2 oz)   BMI 22.79 kg/m²      Physical Exam  General- well defined, well nourished, well hydrated individual in NAD  Skin- normal color and turgor; without nail pitting  Head-normocephalic without masses or tenderness  Eyes-pupils equal round, reactive to light and accommodation, fundi with normal cup/disc ratio, conjunctiva without redness or discharge  Ears-normal pinnae, and canals, with normal landmarks and light reflex of tympanic membranes bilaterally  Nose-septum in the midline, normal mucosa bilaterally  Throat- without erythema or exudate, uvula in midline  Neck-supple without lymphadenopathy or thyromegaly; no carotid bruits  Heart- regular rate and rhythm, normal s1 and s2 without murmur or gallop; peripheral pulses 2+ and symmetrical  Lungs-clear to auscultation  Abdomen-soft, positive bowel sounds, without masses, HSmegaly or pain     Assessment/Plan     Problem List Items Addressed This Visit       Dense breast    Hyperlipidemia    Relevant Medications    atorvastatin (Lipitor) 20 mg tablet    Other Relevant Orders    Lipid Panel    Osteoporosis    Relevant Orders    XR DEXA bone density    Type 2 diabetes mellitus treated without insulin (Multi)    Relevant Medications    metFORMIN  mg 24 hr tablet    Other Relevant Orders    Glucose    RESOLVED: Vertigo     Other Visit Diagnoses       Health care maintenance    -  Primary    Relevant Orders    BI  mammo bilateral screening tomosynthesis    Encounter for screening mammogram for malignant neoplasm of breast        Relevant Orders    BI mammo bilateral screening tomosynthesis          See your eye doctor for a dilated eye exam at least every year to screen for the gradual loss of vision that can occur with Glaucoma and the potentially catastrophic effects of Diabetes.    Follow up in three months for your next routine appointment.    Ye Bledsoe MD

## 2025-03-07 ENCOUNTER — APPOINTMENT (OUTPATIENT)
Dept: PRIMARY CARE | Facility: CLINIC | Age: 75
End: 2025-03-07
Payer: COMMERCIAL

## 2025-03-07 VITALS
BODY MASS INDEX: 22.79 KG/M2 | DIASTOLIC BLOOD PRESSURE: 86 MMHG | SYSTOLIC BLOOD PRESSURE: 123 MMHG | WEIGHT: 154.32 LBS | TEMPERATURE: 97.6 F

## 2025-03-07 DIAGNOSIS — R92.30 DENSE BREAST: ICD-10-CM

## 2025-03-07 DIAGNOSIS — E78.5 HYPERLIPIDEMIA, UNSPECIFIED HYPERLIPIDEMIA TYPE: ICD-10-CM

## 2025-03-07 DIAGNOSIS — Z12.31 ENCOUNTER FOR SCREENING MAMMOGRAM FOR MALIGNANT NEOPLASM OF BREAST: ICD-10-CM

## 2025-03-07 DIAGNOSIS — R92.2 DENSE BREAST: ICD-10-CM

## 2025-03-07 DIAGNOSIS — R42 VERTIGO: ICD-10-CM

## 2025-03-07 DIAGNOSIS — M81.0 OSTEOPOROSIS, UNSPECIFIED OSTEOPOROSIS TYPE, UNSPECIFIED PATHOLOGICAL FRACTURE PRESENCE: ICD-10-CM

## 2025-03-07 DIAGNOSIS — Z00.00 HEALTH CARE MAINTENANCE: Primary | ICD-10-CM

## 2025-03-07 DIAGNOSIS — E11.9 TYPE 2 DIABETES MELLITUS TREATED WITHOUT INSULIN (MULTI): ICD-10-CM

## 2025-03-07 PROCEDURE — 1157F ADVNC CARE PLAN IN RCRD: CPT | Performed by: FAMILY MEDICINE

## 2025-03-07 PROCEDURE — 1159F MED LIST DOCD IN RCRD: CPT | Performed by: FAMILY MEDICINE

## 2025-03-07 PROCEDURE — 3079F DIAST BP 80-89 MM HG: CPT | Performed by: FAMILY MEDICINE

## 2025-03-07 PROCEDURE — 3074F SYST BP LT 130 MM HG: CPT | Performed by: FAMILY MEDICINE

## 2025-03-07 PROCEDURE — 99214 OFFICE O/P EST MOD 30 MIN: CPT | Performed by: FAMILY MEDICINE

## 2025-03-07 PROCEDURE — 1123F ACP DISCUSS/DSCN MKR DOCD: CPT | Performed by: FAMILY MEDICINE

## 2025-03-07 RX ORDER — METFORMIN HYDROCHLORIDE 500 MG/1
500 TABLET, EXTENDED RELEASE ORAL
Qty: 90 TABLET | Refills: 0 | Status: SHIPPED | OUTPATIENT
Start: 2025-03-07

## 2025-03-07 RX ORDER — ATORVASTATIN CALCIUM 20 MG/1
20 TABLET, FILM COATED ORAL DAILY
Qty: 90 TABLET | Refills: 0 | Status: SHIPPED | OUTPATIENT
Start: 2025-03-07

## 2025-03-07 RX ORDER — PSYLLIUM HUSK 0.4 G
1 CAPSULE ORAL DAILY
COMMUNITY

## 2025-03-08 DIAGNOSIS — E11.9 TYPE 2 DIABETES MELLITUS TREATED WITHOUT INSULIN (MULTI): ICD-10-CM

## 2025-03-08 LAB
CHOLEST SERPL-MCNC: 168 MG/DL
CHOLEST/HDLC SERPL: 1.7 (CALC)
GLUCOSE SERPL-MCNC: 115 MG/DL (ref 65–99)
HDLC SERPL-MCNC: 97 MG/DL
LDLC SERPL CALC-MCNC: 56 MG/DL (CALC)
NONHDLC SERPL-MCNC: 71 MG/DL (CALC)
TRIGL SERPL-MCNC: 74 MG/DL

## 2025-03-10 RX ORDER — METFORMIN HYDROCHLORIDE 500 MG/1
TABLET, EXTENDED RELEASE ORAL
Qty: 90 TABLET | Refills: 0 | OUTPATIENT
Start: 2025-03-10

## 2025-06-03 DIAGNOSIS — E11.9 TYPE 2 DIABETES MELLITUS TREATED WITHOUT INSULIN (MULTI): ICD-10-CM

## 2025-06-03 RX ORDER — METFORMIN HYDROCHLORIDE 500 MG/1
TABLET, EXTENDED RELEASE ORAL
Qty: 90 TABLET | Refills: 0 | OUTPATIENT
Start: 2025-06-03

## 2025-06-06 NOTE — PROGRESS NOTES
Subjective   Patient ID: [id number]     Amrita Pepe is a 75 y.o. female who presents for Med Management.    HPI  Taking meds as directed without tolerability or affordability issues; no complaints today.    Review of Systems  CARDIO- No chest pain or pressure, nausea, diaphoresis, paresthesias, dizziness, or syncope with or without exertion  GI-No blood in stool, tarry stools, pain, vomiting, heartburn, constipation or diarrhea  PULM-No wheezing, coughing or shortness of breath  UROL-No frequency, urgency, blood in urine, or incontinence; nocturia not present    Objective     /83 (BP Location: Right arm, Patient Position: Sitting, BP Cuff Size: Adult)   Temp 36.6 °C (97.9 °F) (Oral)   Wt 70.5 kg (155 lb 6.8 oz)   BMI 22.95 kg/m²      Physical Exam  Neck-supple without lymphadenopathy or thyromegaly; no carotid bruits  Heart- regular rate and rhythm, normal s1 and s2 without murmur or gallop; no edema  Lungs-clear to auscultation  Abdomen-soft, positive bowel sounds, without masses, HSmegaly or pain     Assessment/Plan     Problem List Items Addressed This Visit       RESOLVED: Abnormal mammogram    Dense breast    Colon polyp    Hyperlipidemia    Relevant Medications    atorvastatin (Lipitor) 20 mg tablet    Low TSH level    Multiple thyroid nodules    Type 2 diabetes mellitus treated without insulin (Multi) - Primary    Relevant Medications    metFORMIN  mg 24 hr tablet    Other Relevant Orders    Comprehensive Metabolic Panel    Hemoglobin A1C    Thyroid Stimulating Hormone    Albumin-Creatinine Ratio, Urine Random    Vitamin D insufficiency    Relevant Orders    Vitamin D 25-Hydroxy,Total (for eval of Vitamin D levels)     Other Visit Diagnoses         Postmenopausal        Relevant Orders    XR DEXA bone density          See your eye doctor for a dilated eye exam at least every year to screen for the gradual loss of vision that can occur with Glaucoma and the potentially catastrophic effects of  Diabetes.    You are eligible for the COVID booster.  Without a recent (within 90 days) challenge (booster or infection) your immune system will be three days behind in protecting you from the infection.  You will infect approximately five people by that time.    Follow up fasting (no alcohol for 48 hours and just water for 14 hours) in three months for your next routine appointment.  In general, take any medications on schedule (except for types of Insulin).      Ye Bledsoe MD

## 2025-06-09 ENCOUNTER — APPOINTMENT (OUTPATIENT)
Dept: PRIMARY CARE | Facility: CLINIC | Age: 75
End: 2025-06-09
Payer: MEDICARE

## 2025-06-09 VITALS
TEMPERATURE: 97.9 F | DIASTOLIC BLOOD PRESSURE: 83 MMHG | BODY MASS INDEX: 22.95 KG/M2 | WEIGHT: 155.42 LBS | SYSTOLIC BLOOD PRESSURE: 135 MMHG

## 2025-06-09 DIAGNOSIS — E04.2 MULTIPLE THYROID NODULES: ICD-10-CM

## 2025-06-09 DIAGNOSIS — R79.89 LOW TSH LEVEL: ICD-10-CM

## 2025-06-09 DIAGNOSIS — K63.5 POLYP OF COLON, UNSPECIFIED PART OF COLON, UNSPECIFIED TYPE: ICD-10-CM

## 2025-06-09 DIAGNOSIS — E11.9 TYPE 2 DIABETES MELLITUS TREATED WITHOUT INSULIN (MULTI): Primary | ICD-10-CM

## 2025-06-09 DIAGNOSIS — E55.9 VITAMIN D INSUFFICIENCY: ICD-10-CM

## 2025-06-09 DIAGNOSIS — Z78.0 POSTMENOPAUSAL: ICD-10-CM

## 2025-06-09 DIAGNOSIS — E78.5 HYPERLIPIDEMIA, UNSPECIFIED HYPERLIPIDEMIA TYPE: ICD-10-CM

## 2025-06-09 DIAGNOSIS — R92.8 ABNORMAL MAMMOGRAM: ICD-10-CM

## 2025-06-09 DIAGNOSIS — R92.30 DENSE BREAST: ICD-10-CM

## 2025-06-09 PROCEDURE — 1159F MED LIST DOCD IN RCRD: CPT | Performed by: FAMILY MEDICINE

## 2025-06-09 PROCEDURE — 1160F RVW MEDS BY RX/DR IN RCRD: CPT | Performed by: FAMILY MEDICINE

## 2025-06-09 PROCEDURE — 99214 OFFICE O/P EST MOD 30 MIN: CPT | Performed by: FAMILY MEDICINE

## 2025-06-09 PROCEDURE — 3075F SYST BP GE 130 - 139MM HG: CPT | Performed by: FAMILY MEDICINE

## 2025-06-09 PROCEDURE — 3079F DIAST BP 80-89 MM HG: CPT | Performed by: FAMILY MEDICINE

## 2025-06-09 RX ORDER — ATORVASTATIN CALCIUM 20 MG/1
20 TABLET, FILM COATED ORAL DAILY
Qty: 90 TABLET | Refills: 0 | Status: SHIPPED | OUTPATIENT
Start: 2025-06-09

## 2025-06-09 RX ORDER — METFORMIN HYDROCHLORIDE 500 MG/1
500 TABLET, EXTENDED RELEASE ORAL
Qty: 90 TABLET | Refills: 0 | Status: SHIPPED | OUTPATIENT
Start: 2025-06-09

## 2025-06-10 LAB
25(OH)D3+25(OH)D2 SERPL-MCNC: 47 NG/ML (ref 30–100)
ALBUMIN SERPL-MCNC: 4.7 G/DL (ref 3.6–5.1)
ALBUMIN/CREAT UR: 7 MG/G CREAT
ALP SERPL-CCNC: 83 U/L (ref 37–153)
ALT SERPL-CCNC: 11 U/L (ref 6–29)
ANION GAP SERPL CALCULATED.4IONS-SCNC: 11 MMOL/L (CALC) (ref 7–17)
AST SERPL-CCNC: 17 U/L (ref 10–35)
BILIRUB SERPL-MCNC: 0.7 MG/DL (ref 0.2–1.2)
BUN SERPL-MCNC: 18 MG/DL (ref 7–25)
CALCIUM SERPL-MCNC: 9.9 MG/DL (ref 8.6–10.4)
CHLORIDE SERPL-SCNC: 103 MMOL/L (ref 98–110)
CO2 SERPL-SCNC: 25 MMOL/L (ref 20–32)
CREAT SERPL-MCNC: 0.73 MG/DL (ref 0.6–1)
CREAT UR-MCNC: 144 MG/DL (ref 20–275)
EGFRCR SERPLBLD CKD-EPI 2021: 86 ML/MIN/1.73M2
EST. AVERAGE GLUCOSE BLD GHB EST-MCNC: 134 MG/DL
EST. AVERAGE GLUCOSE BLD GHB EST-SCNC: 7.4 MMOL/L
GLUCOSE SERPL-MCNC: 111 MG/DL (ref 65–99)
HBA1C MFR BLD: 6.3 %
MICROALBUMIN UR-MCNC: 1 MG/DL
POTASSIUM SERPL-SCNC: 4.8 MMOL/L (ref 3.5–5.3)
PROT SERPL-MCNC: 6.9 G/DL (ref 6.1–8.1)
SODIUM SERPL-SCNC: 139 MMOL/L (ref 135–146)
TSH SERPL-ACNC: 0.1 MIU/L (ref 0.4–4.5)

## 2025-06-11 DIAGNOSIS — E78.5 HYPERLIPIDEMIA, UNSPECIFIED HYPERLIPIDEMIA TYPE: ICD-10-CM

## 2025-06-11 DIAGNOSIS — R79.89 LOW TSH LEVEL: Primary | ICD-10-CM

## 2025-07-05 LAB
T4 FREE SERPL-MCNC: 1.3 NG/DL (ref 0.8–1.8)
THYROGLOB AB SERPL-ACNC: <1 IU/ML
THYROGLOB SERPL-MCNC: 56.4 NG/ML
THYROPEROXIDASE AB SERPL-ACNC: 1 IU/ML

## 2025-07-07 ENCOUNTER — TELEPHONE (OUTPATIENT)
Dept: PRIMARY CARE | Facility: CLINIC | Age: 75
End: 2025-07-07
Payer: MEDICARE

## 2025-07-07 DIAGNOSIS — R77.8 HIGH SERUM THYROGLOBULIN: Primary | ICD-10-CM

## 2025-07-07 NOTE — TELEPHONE ENCOUNTER
Pt is calling stating that she called to schedule her Endocrinology referral and the first available they had was 12/23/25. Pt is currently scheduled for that day but she wanted to see if that was okay or if she should be seen sooner?

## 2025-07-08 ENCOUNTER — TELEPHONE (OUTPATIENT)
Facility: CLINIC | Age: 75
End: 2025-07-08
Payer: MEDICARE

## 2025-07-08 NOTE — TELEPHONE ENCOUNTER
Please review pt chart to see if wait list or should be placed on a Monday 8am or Wednesday noon.    no tenderness

## 2025-08-09 ENCOUNTER — OFFICE VISIT (OUTPATIENT)
Dept: URGENT CARE | Age: 75
End: 2025-08-09
Payer: MEDICARE

## 2025-08-09 VITALS
HEART RATE: 83 BPM | OXYGEN SATURATION: 97 % | RESPIRATION RATE: 16 BRPM | SYSTOLIC BLOOD PRESSURE: 115 MMHG | TEMPERATURE: 98.7 F | DIASTOLIC BLOOD PRESSURE: 75 MMHG

## 2025-08-09 DIAGNOSIS — N89.8 VAGINAL DRYNESS: Primary | ICD-10-CM

## 2025-08-09 DIAGNOSIS — N89.8 VAGINAL IRRITATION: ICD-10-CM

## 2025-08-09 DIAGNOSIS — N89.8 VAGINAL ITCHING: ICD-10-CM

## 2025-08-09 RX ORDER — CHLORHEXIDINE/GLYCERIN/HE-CELL
JELLY (GRAM) TOPICAL AS NEEDED
Qty: 113 G | Refills: 0 | Status: SHIPPED | OUTPATIENT
Start: 2025-08-09

## 2025-08-09 NOTE — PATIENT INSTRUCTIONS
Vaginal Dryness/Irritation/Itching:  - Advised on possible causes and differentials  - First line of treatment is non-hormonal topical agents for mild to moderate symptoms such as [place] jell, Vaseline ect; can use Vagasil for irritation, if symptoms persist may need Premarin which is a vaginal estrogen for moderate to severe symptoms  - Referral to GYN for further evaluation since declined vaginal exam  - Advised on s/s to seek emergent care for  - Advised this can be very common as we start to get older

## 2025-08-09 NOTE — PROGRESS NOTES
Subjective   Patient ID: Amrita Pepe is a 75 y.o. female. They present today with a chief complaint of Other (Vaginal irritation x 2 weeks).    History of Present Illness  Pt presents to the  with the c/o vaginal irritation and itching. States has been going on for the last 2 weeks. Declined vaginal evaluation. Denies urinary issues. Denies s/s of UTI. Denies vaginal discharge. No other associated symptoms or concerns to address at this time. Did not discuss with her PCP.           Past Medical History  Allergies as of 08/09/2025    (No Known Allergies)       Prescriptions Prior to Admission[1]     Medical History[2]    Surgical History[3]     reports that she quit smoking about 46 years ago. Her smoking use included cigarettes. She started smoking about 56 years ago. She has a 10 pack-year smoking history. She has never used smokeless tobacco. She reports current alcohol use of about 3.0 standard drinks of alcohol per week.    Review of Systems  Review of Systems  10 point ROS completed and all are negative other than what is stated in the current HPI                               Objective    Vitals:    08/09/25 1135   BP: 115/75   Pulse: 83   Resp: 16   Temp: 37.1 °C (98.7 °F)   SpO2: 97%     No LMP recorded.    Physical Exam  Vitals and nursing note reviewed.   Constitutional:       Appearance: Normal appearance. She is not ill-appearing or toxic-appearing.     Cardiovascular:      Rate and Rhythm: Normal rate and regular rhythm.   Pulmonary:      Effort: Pulmonary effort is normal.      Breath sounds: Normal breath sounds.   Abdominal:      Palpations: Abdomen is soft.      Tenderness: There is no abdominal tenderness.   Genitourinary:     Vagina: No vaginal discharge.      Comments: Pt declined external vaginal exam  Denies vaginal discharge  (+) vaginal dryness, irritation and itching  Denies any other abnormalities    Skin:     General: Skin is warm and dry.      Findings: No rash.     Neurological:       Mental Status: She is alert and oriented to person, place, and time.     Psychiatric:         Behavior: Behavior normal.         Procedures    Point of Care Test & Imaging Results from this visit  No results found for this visit on 08/09/25.   Imaging  XR DEXA bone density axial skeleton w VFA  Result Date: 8/7/2025  IMPRESSION: THE LOWEST T-SCORE IS -2.1  IN THE RIGHT HIP 1) DIAGNOSIS (based on BMD alone):  OSTEOPENIA Caution: Medical conditions other than osteoporosis may cause low bone density, such as osteomalacia or renal osteodystrophy.   Clinical correlation is necessary.  2) FRACTURE RISK ( Based on FRAX)                       10-year absolute fracture risk:                            - major osteoporotic fracture = 15.4 % %                            - hip fracture = 2.9 % %                     - A diagnosis of Osteoporosis,  a 10 year probability of hip fracture greater                       than or equal to 3% or a 10 year probability of any major osteoporosis-related                       fracture greater than or equal to 20% should be considered for                       treatment.                     - DXA scanner generated FRAX calculations may slightly differ from                       online FRAX calculations due to differences in software versions.  - All recommendations and calculations are to be considered as    guidelines and should not replace sound clinical judgement - Caution: Fracture risk may be increased independent of BMD in   patients with corticosteroid use, age greater than 65 years, or a   history of prior fragility fracture. RECOMMENDATIONS: Follow-up in 2 years or as clinically indicated.  Patients that are taking corticosteroids, are transplant recipients or have hyperparathyroidism should have annual follow-up.  Follow-up scans should always be done on the same machine for accurate comparison. FOR MORE INFORMATION ABOUT DIAGNOSIS AND TREATMENT: [facility]  Center for Osteoporosis and Metabolic Bone Disease:? www.ccf.org/arthritis/osteo National Osteoporosis Foundation:? www.nof.org International Society of Clinical Densitometry www.iscd.org : ST[place]LEY Transcribe Date/Time: Aug  7 2025  3:21P Dictated by : DENICE MAGANA MD This examination was interpreted and the report reviewed and electronically signed by: DENICE MAGANA MD on Aug  7 2025  3:31PM  EST      Cardiology, Vascular, and Other Imaging  XR DEXA bone density axial skeleton w VFA  Result Date: 8/7/2025  * * *Final Report* * * DATE OF EXAM: Aug  7 2025  3:18PM   NWB   0804  -  BD DXA - AXIAL SKELETON  / ACCESSION #  [id number] PROCEDURE REASON: screening for osteoporosis      * * * * Physician Interpretation * * * *  EXAMINATION: DXA BONE DENSITOMETRY BD DXA - AXIAL SKELETON PATIENT DEMOGRAPHICS:  Age: 75 years, Gender: Female SCANNER INFORMATION: DXA Model: [employer] [id number] Date Scanned:  8/7/2025 3:18 PM CLINICAL HISTORY:  DIAGNOSTIC   screening for osteoporosis. RISK FACTORS FOR OSTEOPOROSIS AND ASSOCIATED FRACTURES REPORTED BY THIS PATIENT: Please refer to Bone Health Questionnaire in the EMR CURRENT THERAPY: Please refer to Bone Health Questionnaire in the EMR TECHNICAL LIMITATIONS: Degenerative disease of the spine RESULTS: Comparison made to prior DXA dated: 2023 Lumbar Spine (L1, L2, L3): Total BMD: 1.035  g/cm2, T-score: -1.1, Z-score: 0.5 Prior Lumbar spine: 1.059 g/cm2 No statistically significant change Right Femoral Neck: 0.831  g/cm2, T-score -1.5, Z-score 0.3 Prior Right Femoral Neck: 0.862 g/cm2 No statistically significant change Right Total Hip: 0.748  g/cm2 , T-score -2.1, Z-score -0.4 Prior Right Total Hip: 0.760 g/cm2 No statistically significant change Left Femoral Neck: 0.832  g/cm2, T-score -1.5, Z-score 0.3 Prior Left Femoral Neck: 0.759 g/cm2 Statistically significant increase Left Total Hip: 0.764  g/cm2, T-score -1.9, Z-score  -0.3 Prior Left  Total Hip: 0.747 g/cm2 No statistically significant change CHANGE IS STATISTICALLY SIGNIFICANT IN THE SPINE OR HIP IF GREATER THAN OR EQUAL TO 0.04 g/cm2 VERTEBRAL FRACTURE ASSESSMENT Not performed. TRABECULAR BONE ASSESSMENT TBS not performed: not ordered    IMPRESSION: THE LOWEST T-SCORE IS -2.1  IN THE RIGHT HIP 1) DIAGNOSIS (based on BMD alone):  OSTEOPENIA Caution: Medical conditions other than osteoporosis may cause low bone density, such as osteomalacia or renal osteodystrophy.   Clinical correlation is necessary.  2) FRACTURE RISK ( Based on FRAX)                       10-year absolute fracture risk:                            - major osteoporotic fracture = 15.4 % %                            - hip fracture = 2.9 % %                     - A diagnosis of Osteoporosis,  a 10 year probability of hip fracture greater                       than or equal to 3% or a 10 year probability of any major osteoporosis-related                       fracture greater than or equal to 20% should be considered for                       treatment.                     - DXA scanner generated FRAX calculations may slightly differ from                       online FRAX calculations due to differences in software versions.  - All recommendations and calculations are to be considered as    guidelines and should not replace sound clinical judgement - Caution: Fracture risk may be increased independent of BMD in   patients with corticosteroid use, age greater than 65 years, or a   history of prior fragility fracture. RECOMMENDATIONS: Follow-up in 2 years or as clinically indicated.  Patients that are taking corticosteroids, are transplant recipients or have hyperparathyroidism should have annual follow-up.  Follow-up scans should always be done on the same machine for accurate comparison. FOR MORE INFORMATION ABOUT DIAGNOSIS AND TREATMENT: [facility] for Osteoporosis and Metabolic Bone Disease:?  www.ccf.org/arthritis/osteo National Osteoporosis Foundation:? www.nof.org International Society of Clinical Densitometry www.iscd.org : [place]LEY Transcribe Date/Time: Aug  7 2025  3:21P Dictated by : DENICE MAGANA MD This examination was interpreted and the report reviewed and electronically signed by: DENICE MAGANA MD on Aug  7 2025  3:31PM  EST        Diagnostic study results (if any) were reviewed by BHANU Stokes.    Assessment/Plan   Allergies, medications, history, and pertinent labs/EKGs/Imaging reviewed by BHANU Stokes.     Medical Decision Making  Vaginal Dryness/Irritation/Itching:  - Advised on possible causes and differentials  - First line of treatment is non-hormonal topical agents for mild to moderate symptoms such as [place] jell, Vaseline ect; can use Vagasil for irritation, if symptoms persist may need Premarin which is a vaginal estrogen for moderate to severe symptoms  - Referral to GYN for further evaluation since declined vaginal exam  - Advised on s/s to seek emergent care for  - Advised this can be very common as we start to get older    At time of discharge patient was clinically well-appearing and HDS for outpatient management. The patient and/or family was educated regarding diagnosis, supportive care, OTC and Rx medications. The patient and/or family was given the opportunity to ask questions prior to discharge.  They verbalized understanding of my discussion of the plans for treatment, expected course, indications to return to UC or seek further evaluation in ED, and the need for timely follow up as directed.   They were provided with a work/school excuse if requested.  AVS provided to patient.  All questions were answered and the patient verbalized understanding of the plan of care for today.        Orders and Diagnoses  There are no diagnoses linked to this encounter.    Medical Admin Record      Patient disposition: Home    Electronically  signed by BHANU Stokes  12:24 PM           [1] (Not in a hospital admission)  [2]   Past Medical History:  Diagnosis Date    Disorder of thyroid, unspecified     Thyroid trouble    Other conditions influencing health status 04/29/2021    History of cough    Personal history of other diseases of the musculoskeletal system and connective tissue     History of arthritis    Personal history of other diseases of the respiratory system 04/29/2021    History of sore throat    Personal history of other infectious and parasitic diseases 04/29/2021    History of viral infection    Personal history of other mental and behavioral disorders 03/29/2018    History of adjustment disorder   [3]   Past Surgical History:  Procedure Laterality Date    FOOT SURGERY  03/23/2015    Foot Surgery

## 2025-08-10 DIAGNOSIS — E78.5 HYPERLIPIDEMIA, UNSPECIFIED HYPERLIPIDEMIA TYPE: ICD-10-CM

## 2025-08-11 RX ORDER — ATORVASTATIN CALCIUM 20 MG/1
20 TABLET, FILM COATED ORAL DAILY
Qty: 90 TABLET | Refills: 0 | OUTPATIENT
Start: 2025-08-11

## 2025-08-12 ENCOUNTER — OFFICE VISIT (OUTPATIENT)
Facility: CLINIC | Age: 75
End: 2025-08-12
Payer: MEDICARE

## 2025-08-12 VITALS
HEIGHT: 70 IN | WEIGHT: 156 LBS | BODY MASS INDEX: 22.33 KG/M2 | DIASTOLIC BLOOD PRESSURE: 61 MMHG | SYSTOLIC BLOOD PRESSURE: 96 MMHG

## 2025-08-12 DIAGNOSIS — L29.2 VULVAR ITCHING: Primary | ICD-10-CM

## 2025-08-12 PROCEDURE — 1159F MED LIST DOCD IN RCRD: CPT | Performed by: OBSTETRICS & GYNECOLOGY

## 2025-08-12 PROCEDURE — 3008F BODY MASS INDEX DOCD: CPT | Performed by: OBSTETRICS & GYNECOLOGY

## 2025-08-12 PROCEDURE — 3078F DIAST BP <80 MM HG: CPT | Performed by: OBSTETRICS & GYNECOLOGY

## 2025-08-12 PROCEDURE — 3074F SYST BP LT 130 MM HG: CPT | Performed by: OBSTETRICS & GYNECOLOGY

## 2025-08-12 PROCEDURE — 99203 OFFICE O/P NEW LOW 30 MIN: CPT | Performed by: OBSTETRICS & GYNECOLOGY

## 2025-08-12 RX ORDER — HYDROCORTISONE 25 MG/G
CREAM TOPICAL 2 TIMES DAILY
Qty: 20 G | Refills: 1 | Status: SHIPPED | OUTPATIENT
Start: 2025-08-12

## 2025-08-12 ASSESSMENT — LIFESTYLE VARIABLES
SKIP TO QUESTIONS 9-10: 1
HOW OFTEN DO YOU HAVE A DRINK CONTAINING ALCOHOL: 2-3 TIMES A WEEK
HOW OFTEN DO YOU HAVE SIX OR MORE DRINKS ON ONE OCCASION: NEVER
HOW MANY STANDARD DRINKS CONTAINING ALCOHOL DO YOU HAVE ON A TYPICAL DAY: 1 OR 2
AUDIT-C TOTAL SCORE: 3

## 2025-08-25 ENCOUNTER — OFFICE VISIT (OUTPATIENT)
Dept: PRIMARY CARE | Facility: CLINIC | Age: 75
End: 2025-08-25
Payer: MEDICARE

## 2025-08-25 VITALS — SYSTOLIC BLOOD PRESSURE: 128 MMHG | TEMPERATURE: 97.6 F | DIASTOLIC BLOOD PRESSURE: 84 MMHG

## 2025-08-25 DIAGNOSIS — R30.0 DYSURIA: ICD-10-CM

## 2025-08-25 LAB
POC APPEARANCE, URINE: CLEAR
POC BILIRUBIN, URINE: NEGATIVE
POC BLOOD, URINE: NEGATIVE
POC COLOR, URINE: YELLOW
POC GLUCOSE, URINE: NEGATIVE MG/DL
POC KETONES, URINE: NEGATIVE MG/DL
POC LEUKOCYTES, URINE: NEGATIVE
POC NITRITE,URINE: NEGATIVE
POC PH, URINE: 7 PH
POC PROTEIN, URINE: NEGATIVE MG/DL
POC SPECIFIC GRAVITY, URINE: 1.01
POC UROBILINOGEN, URINE: 0.2 EU/DL

## 2025-08-25 PROCEDURE — 99213 OFFICE O/P EST LOW 20 MIN: CPT | Performed by: FAMILY MEDICINE

## 2025-08-25 PROCEDURE — 3074F SYST BP LT 130 MM HG: CPT | Performed by: FAMILY MEDICINE

## 2025-08-25 PROCEDURE — 1159F MED LIST DOCD IN RCRD: CPT | Performed by: FAMILY MEDICINE

## 2025-08-25 PROCEDURE — 1160F RVW MEDS BY RX/DR IN RCRD: CPT | Performed by: FAMILY MEDICINE

## 2025-08-25 PROCEDURE — 81003 URINALYSIS AUTO W/O SCOPE: CPT | Performed by: FAMILY MEDICINE

## 2025-08-25 PROCEDURE — 3079F DIAST BP 80-89 MM HG: CPT | Performed by: FAMILY MEDICINE

## 2025-09-23 ENCOUNTER — APPOINTMENT (OUTPATIENT)
Dept: PRIMARY CARE | Facility: CLINIC | Age: 75
End: 2025-09-23
Payer: MEDICARE